# Patient Record
Sex: FEMALE | Race: WHITE | Employment: UNEMPLOYED | URBAN - METROPOLITAN AREA
[De-identification: names, ages, dates, MRNs, and addresses within clinical notes are randomized per-mention and may not be internally consistent; named-entity substitution may affect disease eponyms.]

---

## 2018-07-26 ENCOUNTER — HOSPITAL ENCOUNTER (OUTPATIENT)
Dept: OTHER | Age: 18
Discharge: HOME | End: 2018-07-31

## 2018-07-26 ENCOUNTER — INITIAL PRENATAL (OUTPATIENT)
Dept: OBGYN CLINIC | Age: 18
End: 2018-07-26

## 2018-07-26 VITALS
BODY MASS INDEX: 28 KG/M2 | HEIGHT: 63 IN | SYSTOLIC BLOOD PRESSURE: 116 MMHG | DIASTOLIC BLOOD PRESSURE: 70 MMHG | HEART RATE: 80 BPM | WEIGHT: 158 LBS

## 2018-07-26 DIAGNOSIS — Z34.81 PRENATAL CARE, SUBSEQUENT PREGNANCY, FIRST TRIMESTER: Primary | ICD-10-CM

## 2018-07-26 DIAGNOSIS — Z78.9 LANGUAGE BARRIER: ICD-10-CM

## 2018-07-26 LAB
ABO/RH: NORMAL
ANTIBODY SCREEN: NORMAL
BASOPHILS ABSOLUTE: 0 K/UL (ref 0–0.2)
BASOPHILS RELATIVE PERCENT: 0.3 %
EOSINOPHILS ABSOLUTE: 0.2 K/UL (ref 0–0.6)
EOSINOPHILS RELATIVE PERCENT: 2 %
HCT VFR BLD CALC: 39.9 % (ref 36–48)
HEMOGLOBIN: 13.7 G/DL (ref 12–16)
HEPATITIS B SURFACE ANTIGEN INTERPRETATION: NORMAL
HEPATITIS C ANTIBODY INTERPRETATION: NORMAL
LYMPHOCYTES ABSOLUTE: 2.2 K/UL (ref 1–5.1)
LYMPHOCYTES RELATIVE PERCENT: 27.6 %
MCH RBC QN AUTO: 28.3 PG (ref 26–34)
MCHC RBC AUTO-ENTMCNC: 34.2 G/DL (ref 31–36)
MCV RBC AUTO: 82.9 FL (ref 80–100)
MONOCYTES ABSOLUTE: 0.4 K/UL (ref 0–1.3)
MONOCYTES RELATIVE PERCENT: 5.4 %
NEUTROPHILS ABSOLUTE: 5.2 K/UL (ref 1.7–7.7)
NEUTROPHILS RELATIVE PERCENT: 64.7 %
PDW BLD-RTO: 14.4 % (ref 12.4–15.4)
PLATELET # BLD: 271 K/UL (ref 135–450)
PMV BLD AUTO: 9.7 FL (ref 5–10.5)
RBC # BLD: 4.82 M/UL (ref 4–5.2)
RPR: NORMAL
RUBELLA ANTIBODY IGG: <0.2 IU/ML
WBC # BLD: 8 K/UL (ref 4–11)

## 2018-07-26 PROCEDURE — 99204 OFFICE O/P NEW MOD 45 MIN: CPT | Performed by: OBSTETRICS & GYNECOLOGY

## 2018-07-26 RX ORDER — VITAMIN A, VITAMIN C, VITAMIN D-3, VITAMIN E, VITAMIN B-1, VITAMIN B-2, NIACIN, VITAMIN B-6, CALCIUM, IRON, ZINC, COPPER 4000; 120; 400; 22; 1.84; 3; 20; 10; 1; 12; 200; 27; 25; 2 [IU]/1; MG/1; [IU]/1; MG/1; MG/1; MG/1; MG/1; MG/1; MG/1; UG/1; MG/1; MG/1; MG/1; MG/1
1 TABLET ORAL DAILY
Qty: 30 TABLET | Refills: 11 | Status: ON HOLD | OUTPATIENT
Start: 2018-07-26 | End: 2019-02-21 | Stop reason: HOSPADM

## 2018-07-27 LAB
C TRACH DNA GENITAL QL NAA+PROBE: NEGATIVE
HIV AG/AB: NORMAL
HIV ANTIGEN: NORMAL
HIV-1 ANTIBODY: NORMAL
HIV-2 AB: NORMAL
N. GONORRHOEAE DNA: NEGATIVE
URINE CULTURE, ROUTINE: NORMAL

## 2018-08-01 ENCOUNTER — HOSPITAL ENCOUNTER (OUTPATIENT)
Dept: OTHER | Age: 18
Discharge: HOME | End: 2018-08-31

## 2018-08-23 ENCOUNTER — ROUTINE PRENATAL (OUTPATIENT)
Dept: OBGYN CLINIC | Age: 18
End: 2018-08-23

## 2018-08-23 VITALS
BODY MASS INDEX: 28 KG/M2 | HEART RATE: 74 BPM | WEIGHT: 158 LBS | DIASTOLIC BLOOD PRESSURE: 60 MMHG | SYSTOLIC BLOOD PRESSURE: 123 MMHG

## 2018-08-23 DIAGNOSIS — Z34.81 PRENATAL CARE, SUBSEQUENT PREGNANCY, FIRST TRIMESTER: Primary | ICD-10-CM

## 2018-08-23 DIAGNOSIS — Z78.9 LANGUAGE BARRIER: ICD-10-CM

## 2018-08-23 PROCEDURE — 99212 OFFICE O/P EST SF 10 MIN: CPT | Performed by: OBSTETRICS & GYNECOLOGY

## 2018-08-23 RX ORDER — ONDANSETRON 4 MG/1
4 TABLET, FILM COATED ORAL EVERY 8 HOURS PRN
Qty: 20 TABLET | Refills: 1 | Status: ON HOLD | OUTPATIENT
Start: 2018-08-23 | End: 2019-02-21 | Stop reason: HOSPADM

## 2018-09-01 ENCOUNTER — HOSPITAL ENCOUNTER (OUTPATIENT)
Dept: OTHER | Age: 18
Discharge: HOME | End: 2018-09-01

## 2018-09-20 ENCOUNTER — ROUTINE PRENATAL (OUTPATIENT)
Dept: OBGYN CLINIC | Age: 18
End: 2018-09-20

## 2018-09-20 VITALS
WEIGHT: 163 LBS | HEART RATE: 83 BPM | DIASTOLIC BLOOD PRESSURE: 53 MMHG | SYSTOLIC BLOOD PRESSURE: 110 MMHG | BODY MASS INDEX: 28.88 KG/M2

## 2018-09-20 DIAGNOSIS — Z34.82 PRENATAL CARE, SUBSEQUENT PREGNANCY, SECOND TRIMESTER: Primary | ICD-10-CM

## 2018-09-20 PROCEDURE — 99212 OFFICE O/P EST SF 10 MIN: CPT | Performed by: OBSTETRICS & GYNECOLOGY

## 2018-09-20 NOTE — PROGRESS NOTES
Discussed AFP,CF screening; Pt Declined testing and signed consents. Denies bleeding, leaking of fluid or pain.

## 2018-10-16 ENCOUNTER — ROUTINE PRENATAL (OUTPATIENT)
Dept: OBGYN | Age: 18
End: 2018-10-16

## 2018-10-16 ENCOUNTER — HOSPITAL ENCOUNTER (OUTPATIENT)
Dept: ULTRASOUND IMAGING | Age: 18
Discharge: HOME | End: 2018-10-16

## 2018-10-16 VITALS
BODY MASS INDEX: 29.59 KG/M2 | HEART RATE: 85 BPM | DIASTOLIC BLOOD PRESSURE: 57 MMHG | SYSTOLIC BLOOD PRESSURE: 120 MMHG | WEIGHT: 167 LBS

## 2018-10-16 DIAGNOSIS — Z34.82 PRENATAL CARE, SUBSEQUENT PREGNANCY, SECOND TRIMESTER: ICD-10-CM

## 2018-10-16 DIAGNOSIS — Z34.82 PRENATAL CARE, SUBSEQUENT PREGNANCY, SECOND TRIMESTER: Primary | ICD-10-CM

## 2018-10-16 PROCEDURE — 99212 OFFICE O/P EST SF 10 MIN: CPT | Performed by: OBSTETRICS & GYNECOLOGY

## 2018-11-13 ENCOUNTER — HOSPITAL ENCOUNTER (OUTPATIENT)
Age: 18
Discharge: HOME | End: 2018-11-13

## 2018-11-13 ENCOUNTER — ROUTINE PRENATAL (OUTPATIENT)
Dept: OBGYN | Age: 18
End: 2018-11-13

## 2018-11-13 VITALS
DIASTOLIC BLOOD PRESSURE: 46 MMHG | WEIGHT: 180 LBS | HEART RATE: 97 BPM | BODY MASS INDEX: 31.9 KG/M2 | SYSTOLIC BLOOD PRESSURE: 103 MMHG

## 2018-11-13 DIAGNOSIS — Z34.82 PRENATAL CARE, SUBSEQUENT PREGNANCY, SECOND TRIMESTER: Primary | ICD-10-CM

## 2018-11-13 LAB
GLUCOSE CHALLENGE: 89 MG/DL
HCT VFR BLD CALC: 34.2 % (ref 36–48)
HEMOGLOBIN: 11.4 G/DL (ref 12–16)
MCH RBC QN AUTO: 28.5 PG (ref 26–34)
MCHC RBC AUTO-ENTMCNC: 33.2 G/DL (ref 31–36)
MCV RBC AUTO: 85.6 FL (ref 80–100)
PDW BLD-RTO: 13.7 % (ref 12.4–15.4)
PLATELET # BLD: 306 K/UL (ref 135–450)
PMV BLD AUTO: 9.1 FL (ref 5–10.5)
RBC # BLD: 4 M/UL (ref 4–5.2)
WBC # BLD: 11.6 K/UL (ref 4–11)

## 2018-11-13 PROCEDURE — 36415 COLL VENOUS BLD VENIPUNCTURE: CPT

## 2018-11-13 PROCEDURE — 99212 OFFICE O/P EST SF 10 MIN: CPT | Performed by: OBSTETRICS & GYNECOLOGY

## 2018-11-13 PROCEDURE — G0008 ADMIN INFLUENZA VIRUS VAC: HCPCS | Performed by: OBSTETRICS & GYNECOLOGY

## 2018-11-13 PROCEDURE — 90686 IIV4 VACC NO PRSV 0.5 ML IM: CPT | Performed by: OBSTETRICS & GYNECOLOGY

## 2018-11-13 PROCEDURE — 85027 COMPLETE CBC AUTOMATED: CPT

## 2018-11-13 PROCEDURE — 82950 GLUCOSE TEST: CPT

## 2018-11-13 PROCEDURE — 6360000002 HC RX W HCPCS: Performed by: OBSTETRICS & GYNECOLOGY

## 2018-11-13 RX ADMIN — INFLUENZA A VIRUS A/CALIFORNIA/7/2009 X-179A (H1N1) ANTIGEN (FORMALDEHYDE INACTIVATED), INFLUENZA A VIRUS A/SWITZERLAND/9715293/2013 NIB-88 (H3N2) ANTIGEN (FORMALDEHYDE INACTIVATED), INFLUENZA B VIRUS B/PHUKET/3073/2013 ANTIGEN (FORMALDEHYDE INACTIVATED), AND INFLUENZA B VIRUS B/BRISBANE/60/2008 ANTIGEN (FORMALDEHYDE INACTIVATED) 0.5 ML: 15; 15; 15; 15 INJECTION, SUSPENSION INTRAMUSCULAR at 15:59

## 2018-11-13 NOTE — PROGRESS NOTES
Reports + fetal movement, denies bleeding, leaking of fluid or ctx. Flu VIS given and explained, consent signed, vaccine given. 13lb wt gain since last visit.

## 2018-12-11 ENCOUNTER — ROUTINE PRENATAL (OUTPATIENT)
Dept: OBGYN | Age: 18
End: 2018-12-11

## 2018-12-11 VITALS — SYSTOLIC BLOOD PRESSURE: 111 MMHG | WEIGHT: 182 LBS | BODY MASS INDEX: 32.25 KG/M2 | DIASTOLIC BLOOD PRESSURE: 57 MMHG

## 2018-12-11 DIAGNOSIS — Z34.92 PRENATAL CARE IN SECOND TRIMESTER: Primary | ICD-10-CM

## 2018-12-11 PROCEDURE — 99214 OFFICE O/P EST MOD 30 MIN: CPT | Performed by: OBSTETRICS & GYNECOLOGY

## 2019-01-10 ENCOUNTER — ROUTINE PRENATAL (OUTPATIENT)
Dept: OBGYN | Age: 19
End: 2019-01-10

## 2019-01-10 VITALS
WEIGHT: 187 LBS | HEART RATE: 97 BPM | DIASTOLIC BLOOD PRESSURE: 55 MMHG | BODY MASS INDEX: 33.14 KG/M2 | SYSTOLIC BLOOD PRESSURE: 115 MMHG

## 2019-01-10 DIAGNOSIS — Z34.93 PRENATAL CARE IN THIRD TRIMESTER: Primary | ICD-10-CM

## 2019-01-10 PROCEDURE — 99212 OFFICE O/P EST SF 10 MIN: CPT | Performed by: OBSTETRICS & GYNECOLOGY

## 2019-01-24 ENCOUNTER — ROUTINE PRENATAL (OUTPATIENT)
Dept: OBGYN | Age: 19
End: 2019-01-24

## 2019-01-24 VITALS
HEART RATE: 93 BPM | WEIGHT: 188 LBS | SYSTOLIC BLOOD PRESSURE: 113 MMHG | DIASTOLIC BLOOD PRESSURE: 68 MMHG | BODY MASS INDEX: 33.31 KG/M2

## 2019-01-24 DIAGNOSIS — Z34.93 PRENATAL CARE IN THIRD TRIMESTER: Primary | ICD-10-CM

## 2019-01-24 LAB
BILIRUBIN URINE: NEGATIVE MG/DL
BLOOD, URINE: NEGATIVE
CLARITY: CLEAR
COLOR: YELLOW
GLUCOSE URINE: NEGATIVE MG/DL
KETONES, URINE: NEGATIVE MG/DL
LEUKOCYTE ESTERASE, URINE: ABNORMAL
NITRITE, URINE: NEGATIVE
PH UA: 7
PROTEIN UA: NEGATIVE MG/DL
SPECIFIC GRAVITY UA: 1.02
UROBILINOGEN, URINE: 0.2 E.U./DL

## 2019-01-24 PROCEDURE — 90471 IMMUNIZATION ADMIN: CPT | Performed by: OBSTETRICS & GYNECOLOGY

## 2019-01-24 PROCEDURE — 90715 TDAP VACCINE 7 YRS/> IM: CPT | Performed by: OBSTETRICS & GYNECOLOGY

## 2019-01-24 PROCEDURE — 81003 URINALYSIS AUTO W/O SCOPE: CPT

## 2019-01-24 PROCEDURE — 6360000002 HC RX W HCPCS: Performed by: OBSTETRICS & GYNECOLOGY

## 2019-01-24 PROCEDURE — 99212 OFFICE O/P EST SF 10 MIN: CPT | Performed by: OBSTETRICS & GYNECOLOGY

## 2019-01-24 RX ADMIN — TETANUS TOXOID, REDUCED DIPHTHERIA TOXOID AND ACELLULAR PERTUSSIS VACCINE, ADSORBED 0.5 ML: 5; 2.5; 8; 8; 2.5 SUSPENSION INTRAMUSCULAR at 10:20

## 2019-02-07 ENCOUNTER — ROUTINE PRENATAL (OUTPATIENT)
Dept: OBGYN | Age: 19
End: 2019-02-07

## 2019-02-07 VITALS
WEIGHT: 192.4 LBS | HEART RATE: 89 BPM | DIASTOLIC BLOOD PRESSURE: 65 MMHG | BODY MASS INDEX: 34.09 KG/M2 | SYSTOLIC BLOOD PRESSURE: 115 MMHG

## 2019-02-07 DIAGNOSIS — Z34.93 PRENATAL CARE IN THIRD TRIMESTER: ICD-10-CM

## 2019-02-07 DIAGNOSIS — Z34.83 PRENATAL CARE, SUBSEQUENT PREGNANCY, THIRD TRIMESTER: Primary | ICD-10-CM

## 2019-02-07 PROCEDURE — 87081 CULTURE SCREEN ONLY: CPT

## 2019-02-07 PROCEDURE — 81003 URINALYSIS AUTO W/O SCOPE: CPT

## 2019-02-07 PROCEDURE — 99212 OFFICE O/P EST SF 10 MIN: CPT | Performed by: OBSTETRICS & GYNECOLOGY

## 2019-02-11 LAB — GROUP B STREP CULTURE: NORMAL

## 2019-02-14 ENCOUNTER — ROUTINE PRENATAL (OUTPATIENT)
Dept: OBGYN | Age: 19
End: 2019-02-14

## 2019-02-14 VITALS
HEART RATE: 83 BPM | WEIGHT: 191 LBS | BODY MASS INDEX: 33.84 KG/M2 | SYSTOLIC BLOOD PRESSURE: 116 MMHG | DIASTOLIC BLOOD PRESSURE: 61 MMHG

## 2019-02-14 DIAGNOSIS — Z34.93 PRENATAL CARE IN THIRD TRIMESTER: Primary | ICD-10-CM

## 2019-02-14 PROCEDURE — 99213 OFFICE O/P EST LOW 20 MIN: CPT | Performed by: OBSTETRICS & GYNECOLOGY

## 2019-02-14 PROCEDURE — 81003 URINALYSIS AUTO W/O SCOPE: CPT

## 2019-02-20 ENCOUNTER — ANESTHESIA EVENT (OUTPATIENT)
Dept: LABOR AND DELIVERY | Age: 19
DRG: 560 | End: 2019-02-20
Payer: MEDICAID

## 2019-02-20 ENCOUNTER — ANESTHESIA (OUTPATIENT)
Dept: LABOR AND DELIVERY | Age: 19
DRG: 560 | End: 2019-02-20
Payer: MEDICAID

## 2019-02-20 ENCOUNTER — HOSPITAL ENCOUNTER (INPATIENT)
Age: 19
LOS: 2 days | Discharge: HOME | DRG: 560 | End: 2019-02-22
Attending: OBSTETRICS & GYNECOLOGY | Admitting: OBSTETRICS & GYNECOLOGY
Payer: MEDICAID

## 2019-02-20 LAB
AMPHETAMINE SCREEN, URINE: NORMAL
BARBITURATE SCREEN URINE: NORMAL
BENZODIAZEPINE SCREEN, URINE: NORMAL
BUPRENORPHINE URINE: NORMAL
CANNABINOID SCREEN URINE: NORMAL
COCAINE METABOLITE SCREEN URINE: NORMAL
Lab: NORMAL
METHADONE SCREEN, URINE: NORMAL
OPIATE SCREEN URINE: NORMAL
OXYCODONE URINE: NORMAL
PH UA: 6
PHENCYCLIDINE SCREEN URINE: NORMAL
PROPOXYPHENE SCREEN: NORMAL

## 2019-02-20 PROCEDURE — 1220000000 HC SEMI PRIVATE OB R&B

## 2019-02-20 PROCEDURE — 80307 DRUG TEST PRSMV CHEM ANLYZR: CPT

## 2019-02-20 PROCEDURE — 2580000003 HC RX 258: Performed by: OBSTETRICS & GYNECOLOGY

## 2019-02-20 PROCEDURE — 85027 COMPLETE CBC AUTOMATED: CPT

## 2019-02-20 PROCEDURE — 86780 TREPONEMA PALLIDUM: CPT

## 2019-02-20 RX ORDER — SODIUM CHLORIDE, SODIUM LACTATE, POTASSIUM CHLORIDE, CALCIUM CHLORIDE 600; 310; 30; 20 MG/100ML; MG/100ML; MG/100ML; MG/100ML
INJECTION, SOLUTION INTRAVENOUS CONTINUOUS
Status: DISCONTINUED | OUTPATIENT
Start: 2019-02-20 | End: 2019-02-21

## 2019-02-20 RX ORDER — TERBUTALINE SULFATE 1 MG/ML
0.25 INJECTION, SOLUTION SUBCUTANEOUS
Status: ACTIVE | OUTPATIENT
Start: 2019-02-20 | End: 2019-02-20

## 2019-02-20 RX ORDER — DOCUSATE SODIUM 100 MG/1
100 CAPSULE, LIQUID FILLED ORAL 2 TIMES DAILY
Status: DISCONTINUED | OUTPATIENT
Start: 2019-02-20 | End: 2019-02-21

## 2019-02-20 RX ORDER — SODIUM CHLORIDE 0.9 % (FLUSH) 0.9 %
10 SYRINGE (ML) INJECTION EVERY 12 HOURS SCHEDULED
Status: DISCONTINUED | OUTPATIENT
Start: 2019-02-20 | End: 2019-02-21

## 2019-02-20 RX ORDER — SODIUM CHLORIDE 0.9 % (FLUSH) 0.9 %
10 SYRINGE (ML) INJECTION PRN
Status: DISCONTINUED | OUTPATIENT
Start: 2019-02-20 | End: 2019-02-21

## 2019-02-20 RX ORDER — ONDANSETRON 2 MG/ML
4 INJECTION INTRAMUSCULAR; INTRAVENOUS EVERY 6 HOURS PRN
Status: DISCONTINUED | OUTPATIENT
Start: 2019-02-20 | End: 2019-02-21

## 2019-02-20 RX ADMIN — SODIUM CHLORIDE, POTASSIUM CHLORIDE, SODIUM LACTATE AND CALCIUM CHLORIDE: 600; 310; 30; 20 INJECTION, SOLUTION INTRAVENOUS at 23:26

## 2019-02-20 RX ADMIN — SODIUM CHLORIDE, POTASSIUM CHLORIDE, SODIUM LACTATE AND CALCIUM CHLORIDE: 600; 310; 30; 20 INJECTION, SOLUTION INTRAVENOUS at 22:57

## 2019-02-21 LAB
HCT VFR BLD CALC: 34.3 % (ref 36–48)
HEMATOLOGY PATH CONSULT: NORMAL
HEMATOLOGY PATH CONSULT: YES
HEMOGLOBIN: 11 G/DL (ref 12–16)
MCH RBC QN AUTO: 22 PG (ref 26–34)
MCHC RBC AUTO-ENTMCNC: 31.9 G/DL (ref 31–36)
MCV RBC AUTO: 68.9 FL (ref 80–100)
PDW BLD-RTO: 19.5 % (ref 12.4–15.4)
PLATELET # BLD: 294 K/UL (ref 135–450)
PMV BLD AUTO: 9.5 FL (ref 5–10.5)
RBC # BLD: 4.99 M/UL (ref 4–5.2)
SLIDE REVIEW: ABNORMAL
SPECIMEN STATUS: NORMAL
TOTAL SYPHILLIS IGG/IGM: NORMAL
WBC # BLD: 13.8 K/UL (ref 4–11)

## 2019-02-21 PROCEDURE — 1200000000 HC SEMI PRIVATE

## 2019-02-21 PROCEDURE — 2580000003 HC RX 258: Performed by: OBSTETRICS & GYNECOLOGY

## 2019-02-21 PROCEDURE — 7200000001 HC VAGINAL DELIVERY

## 2019-02-21 PROCEDURE — 0UQGXZZ REPAIR VAGINA, EXTERNAL APPROACH: ICD-10-PCS | Performed by: OBSTETRICS & GYNECOLOGY

## 2019-02-21 PROCEDURE — 6370000000 HC RX 637 (ALT 250 FOR IP): Performed by: OBSTETRICS & GYNECOLOGY

## 2019-02-21 PROCEDURE — 6360000002 HC RX W HCPCS: Performed by: OBSTETRICS & GYNECOLOGY

## 2019-02-21 RX ORDER — ACETAMINOPHEN 500 MG
1000 TABLET ORAL EVERY 6 HOURS PRN
Status: DISCONTINUED | OUTPATIENT
Start: 2019-02-21 | End: 2019-02-22 | Stop reason: HOSPADM

## 2019-02-21 RX ORDER — ONDANSETRON 4 MG/1
4 TABLET, ORALLY DISINTEGRATING ORAL EVERY 6 HOURS PRN
Status: DISCONTINUED | OUTPATIENT
Start: 2019-02-21 | End: 2019-02-22 | Stop reason: HOSPADM

## 2019-02-21 RX ORDER — IBUPROFEN 800 MG/1
800 TABLET ORAL EVERY 6 HOURS PRN
Status: DISCONTINUED | OUTPATIENT
Start: 2019-02-21 | End: 2019-02-22 | Stop reason: HOSPADM

## 2019-02-21 RX ORDER — SIMETHICONE 80 MG
80 TABLET,CHEWABLE ORAL EVERY 6 HOURS PRN
Status: DISCONTINUED | OUTPATIENT
Start: 2019-02-21 | End: 2019-02-22 | Stop reason: HOSPADM

## 2019-02-21 RX ORDER — IBUPROFEN 800 MG/1
800 TABLET ORAL EVERY 8 HOURS PRN
Qty: 30 TABLET | Refills: 0 | Status: SHIPPED | OUTPATIENT
Start: 2019-02-21 | End: 2019-10-15 | Stop reason: SDUPTHER

## 2019-02-21 RX ORDER — SODIUM CHLORIDE 0.9 % (FLUSH) 0.9 %
10 SYRINGE (ML) INJECTION PRN
Status: DISCONTINUED | OUTPATIENT
Start: 2019-02-21 | End: 2019-02-22 | Stop reason: HOSPADM

## 2019-02-21 RX ORDER — FERROUS SULFATE 325(65) MG
325 TABLET ORAL DAILY
Status: DISCONTINUED | OUTPATIENT
Start: 2019-02-21 | End: 2019-02-22 | Stop reason: HOSPADM

## 2019-02-21 RX ORDER — METHYLERGONOVINE MALEATE 0.2 MG/ML
200 INJECTION INTRAVENOUS
Status: ACTIVE | OUTPATIENT
Start: 2019-02-21 | End: 2019-02-21

## 2019-02-21 RX ORDER — FAMOTIDINE 20 MG/1
20 TABLET, FILM COATED ORAL 2 TIMES DAILY PRN
Status: DISCONTINUED | OUTPATIENT
Start: 2019-02-21 | End: 2019-02-22 | Stop reason: HOSPADM

## 2019-02-21 RX ORDER — ACETAMINOPHEN 500 MG
1000 TABLET ORAL EVERY 6 HOURS PRN
Qty: 30 TABLET | Refills: 0 | Status: SHIPPED | OUTPATIENT
Start: 2019-02-21

## 2019-02-21 RX ORDER — LANOLIN 100 %
OINTMENT (GRAM) TOPICAL PRN
Status: DISCONTINUED | OUTPATIENT
Start: 2019-02-21 | End: 2019-02-22 | Stop reason: HOSPADM

## 2019-02-21 RX ORDER — ONDANSETRON 2 MG/ML
4 INJECTION INTRAMUSCULAR; INTRAVENOUS EVERY 6 HOURS PRN
Status: DISCONTINUED | OUTPATIENT
Start: 2019-02-21 | End: 2019-02-22 | Stop reason: HOSPADM

## 2019-02-21 RX ORDER — SENNA AND DOCUSATE SODIUM 50; 8.6 MG/1; MG/1
1 TABLET, FILM COATED ORAL DAILY PRN
Status: DISCONTINUED | OUTPATIENT
Start: 2019-02-21 | End: 2019-02-22 | Stop reason: HOSPADM

## 2019-02-21 RX ORDER — SODIUM CHLORIDE 0.9 % (FLUSH) 0.9 %
10 SYRINGE (ML) INJECTION EVERY 12 HOURS SCHEDULED
Status: DISCONTINUED | OUTPATIENT
Start: 2019-02-21 | End: 2019-02-22 | Stop reason: HOSPADM

## 2019-02-21 RX ORDER — MISOPROSTOL 100 UG/1
800 TABLET ORAL PRN
Status: DISCONTINUED | OUTPATIENT
Start: 2019-02-21 | End: 2019-02-22 | Stop reason: HOSPADM

## 2019-02-21 RX ORDER — DOCUSATE SODIUM 100 MG/1
100 CAPSULE, LIQUID FILLED ORAL 2 TIMES DAILY
Status: DISCONTINUED | OUTPATIENT
Start: 2019-02-21 | End: 2019-02-22 | Stop reason: HOSPADM

## 2019-02-21 RX ORDER — OXYCODONE HYDROCHLORIDE 5 MG/1
5 TABLET ORAL EVERY 4 HOURS PRN
Status: DISCONTINUED | OUTPATIENT
Start: 2019-02-21 | End: 2019-02-22 | Stop reason: HOSPADM

## 2019-02-21 RX ADMIN — DOCUSATE SODIUM 100 MG: 100 CAPSULE, LIQUID FILLED ORAL at 09:08

## 2019-02-21 RX ADMIN — Medication 10 ML: at 09:08

## 2019-02-21 RX ADMIN — Medication 1 MILLI-UNITS/MIN: at 02:00

## 2019-02-21 RX ADMIN — DOCUSATE SODIUM 100 MG: 100 CAPSULE, LIQUID FILLED ORAL at 20:35

## 2019-02-21 RX ADMIN — IBUPROFEN 800 MG: 800 TABLET, FILM COATED ORAL at 09:07

## 2019-02-21 ASSESSMENT — PAIN SCALES - GENERAL: PAINLEVEL_OUTOF10: 3

## 2019-02-22 VITALS
TEMPERATURE: 98.1 F | SYSTOLIC BLOOD PRESSURE: 112 MMHG | DIASTOLIC BLOOD PRESSURE: 63 MMHG | WEIGHT: 191 LBS | HEART RATE: 85 BPM | RESPIRATION RATE: 18 BRPM | BODY MASS INDEX: 33.84 KG/M2

## 2019-02-22 PROCEDURE — 6360000002 HC RX W HCPCS: Performed by: OBSTETRICS & GYNECOLOGY

## 2019-02-22 PROCEDURE — 90471 IMMUNIZATION ADMIN: CPT | Performed by: OBSTETRICS & GYNECOLOGY

## 2019-02-22 PROCEDURE — 90707 MMR VACCINE SC: CPT | Performed by: OBSTETRICS & GYNECOLOGY

## 2019-02-22 PROCEDURE — 6370000000 HC RX 637 (ALT 250 FOR IP): Performed by: OBSTETRICS & GYNECOLOGY

## 2019-02-22 RX ADMIN — MEASLES, MUMPS, AND RUBELLA VIRUS VACCINE LIVE 0.5 ML: 1000; 12500; 1000 INJECTION, POWDER, LYOPHILIZED, FOR SUSPENSION SUBCUTANEOUS at 11:46

## 2019-02-22 RX ADMIN — DOCUSATE SODIUM 100 MG: 100 CAPSULE, LIQUID FILLED ORAL at 07:48

## 2019-04-04 ENCOUNTER — POSTPARTUM VISIT (OUTPATIENT)
Dept: OBGYN | Age: 19
End: 2019-04-04

## 2019-04-04 VITALS
SYSTOLIC BLOOD PRESSURE: 111 MMHG | HEART RATE: 81 BPM | DIASTOLIC BLOOD PRESSURE: 61 MMHG | BODY MASS INDEX: 30.87 KG/M2 | WEIGHT: 174.2 LBS

## 2019-04-04 PROCEDURE — 99215 OFFICE O/P EST HI 40 MIN: CPT | Performed by: OBSTETRICS & GYNECOLOGY

## 2019-04-04 RX ORDER — FLUOXETINE HYDROCHLORIDE 20 MG/1
20 CAPSULE ORAL DAILY
Qty: 30 CAPSULE | Refills: 3 | Status: SHIPPED | OUTPATIENT
Start: 2019-04-04 | End: 2022-04-18

## 2019-04-04 RX ORDER — ACETAMINOPHEN AND CODEINE PHOSPHATE 120; 12 MG/5ML; MG/5ML
1 SOLUTION ORAL DAILY
Qty: 28 TABLET | Refills: 3 | Status: SHIPPED | OUTPATIENT
Start: 2019-04-04 | End: 2022-04-18

## 2019-04-04 NOTE — PROGRESS NOTES
Subjective:      Familia Skelton is a 19 y.o. female who presents 6 weeks post partum following a spontaneous vaginal delivery. I have fully reviewed the prenatal and intrapartum course. The delivery was at 44 gestational weeks. Outcome: . Anesthesia: None. Postpartum course has been uncomplicated}. Baby's course has been doing well without problems. Baby is feeding both breast and bottle - [place]. Bleeding thin lochia. Bowel function is normal. Bladder function is normal. Patient is not sexually active. Contraception method is none. Patient would like to discuss contraception with the doctor. Patient is having irritation in eyes. Postpartum depression screening: positive but no SI/HI. Patient's medications, allergies, past medical, surgical, social and family histories were reviewed and updated as appropriate. Review of Systems  Pertinent items are noted in HPI. Objective:     /61   Pulse 81   Wt 174 lb 3.2 oz (79 kg)   LMP 2019   Breastfeeding? Yes Comment: breast and bottle  BMI 30.87 kg/m²   General:  alert, appears stated age and cooperative    Breasts: Inspection negative. No nipple discharge or bleeding. No masses or nodularity palpable   Lungs: clear to auscultation bilaterally   Heart:  regular rate and rhythm, S1, S2 normal, no murmur, click, rub or gallop   Abdomen: soft, non-tender; bowel sounds normal; no masses,  no organomegaly    Vulva:  normal   Vagina: normal vagina   Cervix:  no lesions   Corpus: normal   Adnexa:  Normal adnexa   Rectal Exam: Not performed. Assessment:     Normal postpartum exam. Pap smear not done at today's visit. Plan:     1. Contraception: oral progesterone-only contraceptive  2. Postpartum depression: will start on Prozac and follow up in 3 weeks  3.  Follow up: 3 weeks

## 2019-04-04 NOTE — PROGRESS NOTES
Social Service Note:   Patient completed depression scale. Patient scored a 10  and is showing signs of depression but denies suicidal or homicidal thoughts. Patient states she has been feeling sad and not wanting to care for child. Patient mother has been helping patient complete care. Patient referred to [address] [place] [address] for mental health services and formerly [facility] to speak with a .      Meghann Gómez BSW, [place]

## 2019-10-15 ENCOUNTER — APPOINTMENT (OUTPATIENT)
Dept: GENERAL RADIOLOGY | Age: 19
End: 2019-10-15

## 2019-10-15 ENCOUNTER — HOSPITAL ENCOUNTER (EMERGENCY)
Age: 19
Discharge: HOME | End: 2019-10-15
Attending: EMERGENCY MEDICINE

## 2019-10-15 VITALS
DIASTOLIC BLOOD PRESSURE: 61 MMHG | HEIGHT: 62 IN | SYSTOLIC BLOOD PRESSURE: 94 MMHG | OXYGEN SATURATION: 97 % | RESPIRATION RATE: 18 BRPM | TEMPERATURE: 98.9 F | HEART RATE: 69 BPM | WEIGHT: 174 LBS | BODY MASS INDEX: 32.02 KG/M2

## 2019-10-15 DIAGNOSIS — S60.211A TRAUMATIC HEMATOMA OF RIGHT WRIST, INITIAL ENCOUNTER: Primary | ICD-10-CM

## 2019-10-15 DIAGNOSIS — M24.541 CONTRACTURE OF JOINT OF FINGER OF RIGHT HAND: ICD-10-CM

## 2019-10-15 PROCEDURE — 6370000000 HC RX 637 (ALT 250 FOR IP): Performed by: PHYSICIAN ASSISTANT

## 2019-10-15 PROCEDURE — 73090 X-RAY EXAM OF FOREARM: CPT

## 2019-10-15 PROCEDURE — 73110 X-RAY EXAM OF WRIST: CPT

## 2019-10-15 PROCEDURE — 99283 EMERGENCY DEPT VISIT LOW MDM: CPT

## 2019-10-15 PROCEDURE — 4500000023 HC ED LEVEL 3 PROCEDURE

## 2019-10-15 RX ORDER — IBUPROFEN 800 MG/1
800 TABLET ORAL EVERY 8 HOURS PRN
Qty: 30 TABLET | Refills: 0 | Status: SHIPPED | OUTPATIENT
Start: 2019-10-15 | End: 2022-04-18

## 2019-10-15 RX ORDER — IBUPROFEN 600 MG/1
600 TABLET ORAL ONCE
Status: COMPLETED | OUTPATIENT
Start: 2019-10-15 | End: 2019-10-15

## 2019-10-15 RX ADMIN — IBUPROFEN 600 MG: 600 TABLET, FILM COATED ORAL at 11:17

## 2019-10-15 ASSESSMENT — PAIN SCALES - GENERAL: PAINLEVEL_OUTOF10: 7

## 2019-10-15 ASSESSMENT — PAIN DESCRIPTION - LOCATION: LOCATION: HAND;WRIST

## 2019-10-15 ASSESSMENT — PAIN DESCRIPTION - PAIN TYPE: TYPE: ACUTE PAIN

## 2019-10-15 ASSESSMENT — PAIN DESCRIPTION - ORIENTATION: ORIENTATION: RIGHT

## 2019-10-21 ENCOUNTER — OFFICE VISIT (OUTPATIENT)
Dept: ORTHOPEDIC SURGERY | Age: 19
End: 2019-10-21

## 2019-10-21 VITALS
SYSTOLIC BLOOD PRESSURE: 102 MMHG | HEIGHT: 62 IN | DIASTOLIC BLOOD PRESSURE: 65 MMHG | BODY MASS INDEX: 32.02 KG/M2 | WEIGHT: 174 LBS

## 2019-10-21 DIAGNOSIS — M25.531 RIGHT WRIST PAIN: ICD-10-CM

## 2019-10-21 PROCEDURE — 99203 OFFICE O/P NEW LOW 30 MIN: CPT | Performed by: PHYSICIAN ASSISTANT

## 2020-08-04 ENCOUNTER — HOSPITAL ENCOUNTER (EMERGENCY)
Age: 20
Discharge: HOME | End: 2020-08-04
Attending: EMERGENCY MEDICINE

## 2020-08-04 VITALS
RESPIRATION RATE: 16 BRPM | SYSTOLIC BLOOD PRESSURE: 117 MMHG | TEMPERATURE: 97.6 F | BODY MASS INDEX: 26.68 KG/M2 | HEART RATE: 78 BPM | DIASTOLIC BLOOD PRESSURE: 69 MMHG | WEIGHT: 166 LBS | HEIGHT: 66 IN | OXYGEN SATURATION: 99 %

## 2020-08-04 PROCEDURE — 6370000000 HC RX 637 (ALT 250 FOR IP)

## 2020-08-04 PROCEDURE — 99282 EMERGENCY DEPT VISIT SF MDM: CPT

## 2020-08-04 RX ORDER — VALACYCLOVIR HYDROCHLORIDE 1 G/1
1000 TABLET, FILM COATED ORAL 3 TIMES DAILY
Qty: 21 TABLET | Refills: 0 | Status: SHIPPED | OUTPATIENT
Start: 2020-08-04 | End: 2020-08-11

## 2020-08-04 RX ORDER — VALACYCLOVIR HYDROCHLORIDE 500 MG/1
1000 TABLET, FILM COATED ORAL ONCE
Status: DISCONTINUED | OUTPATIENT
Start: 2020-08-04 | End: 2020-08-04

## 2020-08-04 RX ORDER — PREDNISONE 20 MG/1
60 TABLET ORAL ONCE
Status: COMPLETED | OUTPATIENT
Start: 2020-08-04 | End: 2020-08-04

## 2020-08-04 RX ORDER — MINERAL OIL/PETROLATUM,WHITE 3 %-94 %
OINTMENT (GRAM) OPHTHALMIC (EYE)
Qty: 3.5 G | Refills: 0 | Status: SHIPPED | OUTPATIENT
Start: 2020-08-04 | End: 2022-04-18

## 2020-08-04 RX ORDER — PREDNISONE 20 MG/1
TABLET ORAL
Status: COMPLETED
Start: 2020-08-04 | End: 2020-08-04

## 2020-08-04 RX ORDER — PREDNISONE 10 MG/1
60 TABLET ORAL DAILY
Qty: 42 TABLET | Refills: 0 | Status: SHIPPED | OUTPATIENT
Start: 2020-08-04 | End: 2020-08-11

## 2020-08-04 RX ORDER — ACYCLOVIR 200 MG/1
800 CAPSULE ORAL ONCE
Status: COMPLETED | OUTPATIENT
Start: 2020-08-04 | End: 2020-08-04

## 2020-08-04 RX ORDER — ACYCLOVIR 200 MG/1
CAPSULE ORAL
Status: COMPLETED
Start: 2020-08-04 | End: 2020-08-04

## 2020-08-04 RX ORDER — POLYETHYLENE GLYCOL, PROPYLENE GLYCOL .4; .3 G/100ML; G/100ML
2 LIQUID OPHTHALMIC PRN
Qty: 10 ML | Refills: 0 | Status: SHIPPED | OUTPATIENT
Start: 2020-08-04 | End: 2022-04-18

## 2020-08-04 RX ORDER — VALACYCLOVIR HYDROCHLORIDE 500 MG/1
1000 TABLET, FILM COATED ORAL 2 TIMES DAILY
Status: DISCONTINUED | OUTPATIENT
Start: 2020-08-04 | End: 2020-08-04

## 2020-08-04 RX ADMIN — PREDNISONE 60 MG: 20 TABLET ORAL at 19:37

## 2020-08-04 RX ADMIN — ACYCLOVIR 800 MG: 200 CAPSULE ORAL at 19:38

## 2020-08-04 ASSESSMENT — ENCOUNTER SYMPTOMS
VOMITING: 0
SINUS PAIN: 0
FACIAL SWELLING: 0
CONSTIPATION: 0
COLOR CHANGE: 0
EYE ITCHING: 0
SORE THROAT: 0
NAUSEA: 0
VOICE CHANGE: 0
COUGH: 0
RESPIRATORY NEGATIVE: 1
RHINORRHEA: 0
EYE REDNESS: 0
PHOTOPHOBIA: 0
EYE DISCHARGE: 1
SINUS PRESSURE: 0
SHORTNESS OF BREATH: 0
BACK PAIN: 0
TROUBLE SWALLOWING: 0
EYE PAIN: 0
ABDOMINAL PAIN: 0
DIARRHEA: 0

## 2020-08-04 ASSESSMENT — PAIN SCALES - GENERAL: PAINLEVEL_OUTOF10: 8

## 2020-08-04 ASSESSMENT — PAIN DESCRIPTION - LOCATION: LOCATION: MOUTH

## 2020-08-04 ASSESSMENT — PAIN DESCRIPTION - PAIN TYPE: TYPE: ACUTE PAIN

## 2020-08-04 NOTE — ED PROVIDER NOTES
[address] [facility]        Pt Name: Amy Russell  MRN: [id number]  [place] 2000  Date of evaluation: 8/4/2020  Provider: CURTIS Biswas  PCP: No primary care provider on file. I have seen and evaluated this patient with my supervising physician [id number] [facility]       Chief Complaint   Patient presents with    Oral Swelling     pt has lower lip swelling and pain, lip pulls to left and unable to close right eye since yesterday       HISTORY OF PRESENT ILLNESS   (Location, Timing/Onset, Context/Setting, Quality, Duration, Modifying Factors, Severity, Associated Signs and Symptoms)  Note limiting factors. Amy Russell is a 22 y.o. female with no significant past medical history who presents to the ED with nursing triage note as oral swelling. Patient denies any swelling to the lip but states she feels like her lip does not move on the left. States she is unable to close her left eye. Patient states this started last night. Patient denies history of similar symptoms in the past.  Denies headache, visual changes, speech disturbances, numbness/tingling, lightheadedness/dizziness, other weakness throughout. Denies chest pain, shortness of breath, abdominal pain, nausea/vomiting, urinary symptoms or changes in bowel movements. Denies fever chills. Denies sick contacts or recent travel. Denies rashes or lesions. Patient states she does have tearing to the left eye. Denies any pain to the left eye. Denies any eye redness. Denies any foreign body sensation. Nursing Notes were all reviewed and agreed with or any disagreements were addressed in the HPI. REVIEW OF SYSTEMS    (2-9 systems for level 4, 10 or more for level 5)     Review of Systems   Constitutional: Negative for activity change, appetite change, chills and fever.    HENT: Negative for congestion, drooling, ear discharge, ear pain, facial swelling, hearing loss, mouth sores, postnasal drip, rhinorrhea, sinus pressure, sinus pain, sore throat, trouble swallowing and voice change. Eyes: Positive for discharge. Negative for photophobia, pain, redness, itching and visual disturbance. Respiratory: Negative. Negative for cough and shortness of breath. Cardiovascular: Negative. Negative for chest pain. Gastrointestinal: Negative for abdominal pain, constipation, diarrhea, nausea and vomiting. Genitourinary: Negative for difficulty urinating and dysuria. Musculoskeletal: Negative for arthralgias, back pain, myalgias, neck pain and neck stiffness. Skin: Negative for color change, pallor, rash and wound. Neurological: Positive for facial asymmetry and weakness. Negative for dizziness, tremors, seizures, syncope, speech difficulty, light-headedness, numbness and headaches. Positives and Pertinent negatives as per HPI. Except as noted above in the ROS, all other systems were reviewed and negative. PAST MEDICAL HISTORY     Past Medical History:   Diagnosis Date    Asthma     childhood         SURGICAL HISTORY   History reviewed. No pertinent surgical history. [address]       Discharge Medication List as of 8/4/2020  7:43 PM      CONTINUE these medications which have NOT CHANGED    Details   ibuprofen (ADVIL;MOTRIN) 800 MG tablet Take 1 tablet by mouth every 8 hours as needed for Pain, Disp-30 tablet, R-0Print      Prenatal MV-Min-Fe Fum-FA-DHA (PRENATAL 1 PO) Take 1 tablet by mouthHistorical Med      FLUoxetine (PROZAC) 20 MG capsule Take 1 capsule by mouth daily, Disp-30 capsule, R-3Print      norethindrone (ORTHO MICRONOR) 0.35 MG tablet Take 1 tablet by mouth daily, Disp-28 tablet, R-3Print      acetaminophen (APAP EXTRA STRENGTH) 500 MG tablet Take 2 tablets by mouth every 6 hours as needed for Pain, Disp-30 tablet, R-0Print               ALLERGIES     Patient has no known allergies.     FAMILYHISTORY Family History   Problem Relation Age of Onset    Diabetes Maternal Grandmother           SOCIAL HISTORY       Social History     Tobacco Use    Smoking status: Never Smoker    Smokeless tobacco: Never Used   Substance Use Topics    Alcohol use: No    Drug use: No       SCREENINGS             PHYSICAL EXAM    (up to 7 for level 4, 8 or more for level 5)     ED Triage Vitals [08/04/20 1720]   BP Temp Temp Source Pulse Resp SpO2 Height Weight   126/74 97.6 °F (36.4 °C) Temporal 78 16 99 % 5' 6\" (1.676 m) 166 lb (75.3 kg)       Physical Exam  Constitutional:       General: She is not in acute distress. Appearance: Normal appearance. She is well-developed. She is not ill-appearing, toxic-appearing or diaphoretic. HENT:      Head: Normocephalic and atraumatic. Right Ear: External ear normal.      Left Ear: External ear normal.   Eyes:      General: Lids are normal. No visual field deficit. Right eye: No discharge. Left eye: No discharge. Extraocular Movements: Extraocular movements intact. Conjunctiva/sclera: Conjunctivae normal.      Right eye: Right conjunctiva is not injected. No chemosis, exudate or hemorrhage. Left eye: Left conjunctiva is not injected. No chemosis, exudate or hemorrhage. Pupils: Pupils are equal, round, and reactive to light. Comments: Unable to close the left eye. No conjunctival injection noted. No drainage noted. EOMs intact. PERRLA. Visual acuity visual fields grossly intact. Neck:      Musculoskeletal: Normal range of motion and neck supple. Cardiovascular:      Rate and Rhythm: Normal rate and regular rhythm. Pulses: Normal pulses. Heart sounds: Normal heart sounds. No murmur. No friction rub. No gallop. Pulmonary:      Effort: Pulmonary effort is normal. No respiratory distress. Breath sounds: Normal breath sounds. No stridor. No wheezing, rhonchi or rales. Chest:      Chest wall: No tenderness. Abdominal:      General: Abdomen is flat. Bowel sounds are normal. There is no distension. Palpations: Abdomen is soft. There is no mass. Tenderness: There is no abdominal tenderness. There is no right CVA tenderness, left CVA tenderness, guarding or rebound. Hernia: No hernia is present. Musculoskeletal: Normal range of motion. Skin:     General: Skin is warm and dry. Coloration: Skin is not pale. Findings: No erythema or rash. Neurological:      General: No focal deficit present. Mental Status: She is alert and oriented to person, place, and time. GCS: GCS eye subscore is 4. GCS verbal subscore is 5. GCS motor subscore is 6. Cranial Nerves: Facial asymmetry present. No dysarthria. Sensory: No sensory deficit. Motor: Motor function is intact. Comments: Gait normal.  Left-sided facial droop noted. Unable to close the left eye. Unable to wrinkle the left forehead. Sensation intact to the upper extremities and lower extremities throughout. No weakness to the upper or lower extremities throughout. Distal neurovascular intact. Gait normal.  Cranial nerves II through XII otherwise intact. Psychiatric:         Behavior: Behavior normal.         DIAGNOSTIC RESULTS   LABS:    Labs Reviewed - No data to display    All other labs were within normal range or not returned as of this dictation. EKG: All EKG's are interpreted by the Emergency Department Physician in the absence of a cardiologist.  Please see their note for interpretation of EKG. RADIOLOGY:   Non-plain film images such as CT, Ultrasound and MRI are read by the radiologist. Plain radiographic images are visualized and preliminarily interpreted by the ED Provider with the below findings:        Interpretation per the Radiologist below, if available at the time of this note:    No orders to display     No results found.         PROCEDURES   Unless otherwise noted below, none Procedures    CRITICAL CARE TIME   N/A    CONSULTS:  None      EMERGENCY DEPARTMENT COURSE and DIFFERENTIAL DIAGNOSIS/MDM:   Vitals:    Vitals:    08/04/20 1720 08/04/20 1939   BP: 126/74 117/69   Pulse: 78 78   Resp: 16    Temp: 97.6 °F (36.4 °C)    TempSrc: Temporal    SpO2: 99% 99%   Weight: 166 lb (75.3 kg)    Height: 5' 6\" (1.676 m)        Patient was given the following medications:  Medications   predniSONE (DELTASONE) tablet 60 mg (60 mg Oral Given 8/4/20 1937)   acyclovir (ZOVIRAX) capsule 800 mg (800 mg Oral Given 8/4/20 1938)           Patient is a 25-year-old female who presents the ED with complaint of left-sided facial weakness. Upon examination patient unable to close left eye with left-sided facial droop and inability to wrinkle the left-sided forehead. Has what appears to be Bell's palsy. Otherwise neurologically intact. Do not believe further work-up or imaging indicated this time. Patient given first dose prednisone and antiviral here in the emergency department. Will discharge home with prednisone, valacyclovir, lubricating eyedrops and lubricating eye ointment. Instructed tape eyelid shut at night with lubricating eye ointment in place to prevent corneal abrasion/ulcer. Instructed that weakness may not improve entirely. Follow-up with PCP and  Ophthalmology. Return to the ED for any worsening symptoms. Low suspicion for CVA, TIA, subarachnoid hemorrhage, subdural hematoma, meningitis, encephalitis, temporal arteritis, atypical migraine, acute glaucoma, corneal abrasion/ulcer at this time, electrode abnormality, cardiac arrhythmia or other emergent etiology at this time. FINAL IMPRESSION      1.  Bell's palsy          DISPOSITION/PLAN   DISPOSITION Decision To Discharge 08/04/2020 07:14:23 PM      PATIENT REFERREDTO:  [facility] Emergency Department  [address] [place] [address]  [phone]  Go to   As needed, If symptoms worsen    [facility] [address] [facility]    Schedule an appointment as soon as possible for a visit   For a Re-check in  3-5    days.     [facility]  [phone]          DISCHARGE MEDICATIONS:  Discharge Medication List as of 8/4/2020  7:43 PM      START taking these medications    Details   polyethyl glycol-propyl glycol 0.4-0.3 % (LUBRICATING EYE DROPS) 0.4-0.3 % ophthalmic solution Place 2 drops into the left eye as needed for Dry Eyes, Disp-10 mL,R-0Print      White Petrolatum-Mineral Oil ([facility] LUBRICATING EYE/OVERNIGHT) OINT Apply to left eye nightly., Disp-3.5 g,R-0Print      valACYclovir (VALTREX) 1 g tablet Take 1 tablet by mouth 3 times daily for 7 days, Disp-21 tablet,R-0Print      predniSONE (DELTASONE) 10 MG tablet Take 6 tablets by mouth daily for 7 doses, Disp-42 tablet,R-0Print             DISCONTINUED MEDICATIONS:  Discharge Medication List as of 8/4/2020  7:43 PM                 (Please note that portions of this note were completed with a voice recognition program.  Efforts were made to edit the dictations but occasionally words are mis-transcribed.)    CURTIS Ch (electronically signed)          CURTIS Madera  08/04/20 2041

## 2020-08-04 NOTE — ED NOTES
Nursing Discharge Notes:  -Patient discharged at this time in no acute distress after verbalizing understanding of discharge instructions.  -A copy of the AVS was reviewed with pt.  -Pt received applicable scripts which were reviewed with pt by this RN. -Pt was given the opportunity to ask questions before signing for discharge.    -Pt left ambulatory to lobby / discharge area. Patient Education:  Learner - Patient. Motivation and Readiness To Learn - Medium to High  Barriers To Learning - None  Learning Preference / Provided Instructions - Both written and verbal discharge instructions.        Loralee Paget, RN  08/04/20 [id number]

## 2020-08-05 NOTE — ED PROVIDER NOTES
I independently performed a history and physical on [address] [place] [address]. All diagnostic, treatment, and disposition decisions were made by myself in conjunction with the advanced practice provider. Briefly, this is a 22 y.o. female here for left-sided facial droop and inability to close her left eye which began about 24 hours ago and has been worsening. Denies history of similar. She denies history of hypertension, high cholesterol, diabetes or tobacco use. Associated with left ear pain. .    On exam, Patient afebrile and nontoxic. No distress. Heart RRR. Lungs CTAB. Bilateral tympanic membranes nonerythematous, nonbulging, landmarks visible, no erythema or vesicles of EAC. A&Ox4. Left sided facial droop, inability to raise left eyebrow or close left eye, loss of forehead creases left side, 5/5 motor and sensation grossly intact all extremities. No pronator drift. Normal finger to nose. Normal gait observed. .        Screenings            MDM    Patient afebrile and nontoxic. No distress. Neuro exam consistent with Bell's palsy. Patient is otherwise healthy young female with no risk factors for CVA. Nothing to suggest bacterial infection. No evidence of Eaton Bocanegra. Findings discussed with patient at length, will start steroids and antiviral.  Counseled importance of close PCP follow-up and patient verbalized understanding. Patient Referrals:  [facility]  [address] [facility]  [phone]  Go to   As needed, If symptoms worsen    [facility] [address]    Schedule an appointment as soon as possible for a visit   For a Re-check in  3-5    days.     [facility]  [phone]          Discharge Medications:  Discharge Medication List as of 8/4/2020  7:43 PM      START taking these medications    Details   polyethyl glycol-propyl glycol 0.4-0.3 % (LUBRICATING EYE DROPS) 0.4-0.3 % ophthalmic solution Place 2 drops into the left eye as needed for Dry Eyes, Disp-10 mL,R-0Print      White Petrolatum-Mineral Oil ([facility] LUBRICATING EYE/OVERNIGHT) OINT Apply to left eye nightly., Disp-3.5 g,R-0Print      valACYclovir (VALTREX) 1 g tablet Take 1 tablet by mouth 3 times daily for 7 days, Disp-21 tablet,R-0Print      predniSONE (DELTASONE) 10 MG tablet Take 6 tablets by mouth daily for 7 doses, Disp-42 tablet,R-0Print             FINAL IMPRESSION  1. Bell's palsy        Blood pressure 117/69, pulse 78, temperature 97.6 °F (36.4 °C), temperature source Temporal, resp. rate 16, height 5' 6\" (1.676 m), weight 166 lb (75.3 kg), last menstrual period 07/28/2020, SpO2 99 %, currently breastfeeding. For further details of [place] [address] emergency department encounter, please see documentation by advanced practice provider, CURTIS Leung.     Sandro Villanueva DO (electronically signed)  Attending Emergency Physician       Sandro Villanueva DO  08/04/20 5372

## 2022-04-18 ENCOUNTER — OFFICE VISIT (OUTPATIENT)
Dept: PRIMARY CARE CLINIC | Age: 22
End: 2022-04-18

## 2022-04-18 VITALS
TEMPERATURE: 97.8 F | BODY MASS INDEX: 31.89 KG/M2 | OXYGEN SATURATION: 95 % | HEART RATE: 77 BPM | HEIGHT: 63 IN | DIASTOLIC BLOOD PRESSURE: 60 MMHG | SYSTOLIC BLOOD PRESSURE: 108 MMHG | WEIGHT: 180 LBS

## 2022-04-18 DIAGNOSIS — Z86.69 HISTORY OF BELL'S PALSY: ICD-10-CM

## 2022-04-18 DIAGNOSIS — Z76.89 ENCOUNTER TO ESTABLISH CARE WITH NEW DOCTOR: ICD-10-CM

## 2022-04-18 DIAGNOSIS — M25.531 RIGHT WRIST PAIN: ICD-10-CM

## 2022-04-18 DIAGNOSIS — E66.9 OBESITY, CLASS I, BMI 30-34.9: ICD-10-CM

## 2022-04-18 DIAGNOSIS — F32.9 REACTIVE DEPRESSION: Primary | ICD-10-CM

## 2022-04-18 PROCEDURE — 99203 OFFICE O/P NEW LOW 30 MIN: CPT

## 2022-04-18 RX ORDER — ETONOGESTREL 68 MG/1
68 IMPLANT SUBCUTANEOUS
COMMUNITY
Start: 2020-02-07

## 2022-04-18 RX ORDER — IBUPROFEN 600 MG/1
600 TABLET ORAL 4 TIMES DAILY PRN
Qty: 120 TABLET | Refills: 0 | Status: SHIPPED | OUTPATIENT
Start: 2022-04-18

## 2022-04-18 RX ORDER — FLUOXETINE 10 MG/1
10 CAPSULE ORAL DAILY
Qty: 30 CAPSULE | Refills: 3 | Status: SHIPPED | OUTPATIENT
Start: 2022-04-18

## 2022-04-18 ASSESSMENT — ENCOUNTER SYMPTOMS
CHEST TIGHTNESS: 0
SHORTNESS OF BREATH: 0
COUGH: 0
ABDOMINAL PAIN: 0
NAUSEA: 0
WHEEZING: 0
BLOOD IN STOOL: 0
DIARRHEA: 0
VOMITING: 0

## 2022-04-18 ASSESSMENT — PATIENT HEALTH QUESTIONNAIRE - PHQ9
9. THOUGHTS THAT YOU WOULD BE BETTER OFF DEAD, OR OF HURTING YOURSELF: 0
SUM OF ALL RESPONSES TO PHQ9 QUESTIONS 1 & 2: 4
SUM OF ALL RESPONSES TO PHQ QUESTIONS 1-9: 20
4. FEELING TIRED OR HAVING LITTLE ENERGY: 3
8. MOVING OR SPEAKING SO SLOWLY THAT OTHER PEOPLE COULD HAVE NOTICED. OR THE OPPOSITE, BEING SO FIGETY OR RESTLESS THAT YOU HAVE BEEN MOVING AROUND A LOT MORE THAN USUAL: 3
7. TROUBLE CONCENTRATING ON THINGS, SUCH AS READING THE NEWSPAPER OR WATCHING TELEVISION: 3
6. FEELING BAD ABOUT YOURSELF - OR THAT YOU ARE A FAILURE OR HAVE LET YOURSELF OR YOUR FAMILY DOWN: 3
3. TROUBLE FALLING OR STAYING ASLEEP: 1
10. IF YOU CHECKED OFF ANY PROBLEMS, HOW DIFFICULT HAVE THESE PROBLEMS MADE IT FOR YOU TO DO YOUR WORK, TAKE CARE OF THINGS AT HOME, OR GET ALONG WITH OTHER PEOPLE: 2
SUM OF ALL RESPONSES TO PHQ QUESTIONS 1-9: 20
5. POOR APPETITE OR OVEREATING: 3
2. FEELING DOWN, DEPRESSED OR HOPELESS: 3
SUM OF ALL RESPONSES TO PHQ QUESTIONS 1-9: 20
SUM OF ALL RESPONSES TO PHQ QUESTIONS 1-9: 20
1. LITTLE INTEREST OR PLEASURE IN DOING THINGS: 1

## 2022-04-18 ASSESSMENT — COLUMBIA-SUICIDE SEVERITY RATING SCALE - C-SSRS
2. HAVE YOU ACTUALLY HAD ANY THOUGHTS OF KILLING YOURSELF?: YES
1. WITHIN THE PAST MONTH, HAVE YOU WISHED YOU WERE DEAD OR WISHED YOU COULD GO TO SLEEP AND NOT WAKE UP?: YES
4. HAVE YOU HAD THESE THOUGHTS AND HAD SOME INTENTION OF ACTING ON THEM?: NO
6. HAVE YOU EVER DONE ANYTHING, STARTED TO DO ANYTHING, OR PREPARED TO DO ANYTHING TO END YOUR LIFE?: NO
5. HAVE YOU STARTED TO WORK OUT OR WORKED OUT THE DETAILS OF HOW TO KILL YOURSELF? DO YOU INTEND TO CARRY OUT THIS PLAN?: YES
3. HAVE YOU BEEN THINKING ABOUT HOW YOU MIGHT KILL YOURSELF?: NO

## 2022-04-18 NOTE — ASSESSMENT & PLAN NOTE
Patient reports some residual, intermittent pains to left side of face, no paralysis or N/T. Encouraged patient to massage area and use heat pad as needed, take ibuprofen for pain.

## 2022-04-18 NOTE — ASSESSMENT & PLAN NOTE
Discussed calorie tracking and increasing physical activity to high intensity work outs that elevate heart rate, with goal of 150 minutes weekly - minimum. Discussed intermittent fasting, diets like Mediterranean and low carbohydrates. Increase water intake, eliminate sugary beverages. Last Body mass index is 32.4 kg/m².

## 2022-04-18 NOTE — PATIENT INSTRUCTIONS
Patient Education        Recuperación de la depresión: Instrucciones de cuidado  Recovering From Depression: Care Instructions  Instrucciones de cuidado     Tener un buen cuidado de usted mismo es importante a medida que se recupera de la depresión. Con el tiempo, a medida que el tratamiento funcione ariel síntomas desaparecerán. No lo abandone. Al contrario, concentre camilo energía enrecuperarse. Camilo estado de ánimo mejorará. Solo tomará un tiempo. Concéntrese en cosas que le pueden ayudar a sentirse mejor, shaq estar con amigos o familiares, comer marylin y descansar lo suficiente. Mimi tómese las cosas con tranquilidad. No hagamuchas actividades demasiado pronto. Alyse Chant a sentirse mejor poco a [place] de seguimiento es elba parte clave de camilo tratamiento y seguridad. Asegúrese de hacer y acudir a todas las citas, y llame a camilo médico si está teniendo problemas. También es elba buena idea saber los [place] de susexámenes y mantener elba lista de los medicamentos que chaim. ¿Cómo puede cuidarse en el hogar? Sea realista   Si debe hacer elba tarea que le llevará [place], [place] en varias etapas pequeñas que usted pueda manejar y david solo lo que pueda.  Es posible que [place] posponer las decisiones importantes hasta que se haya recuperado de la depresión. Si tiene planes que tendrán un gran impacto en camilo jordan, shaq casarse, divorciarse o cambiar de [place], intente esperar un poco. Háblelo con ariel amigos y seres queridos, quienes pueden ayudarle a analizar el panorama completo.  Es importante acercarse a las personas para pedirles ayuda. No se aísle. Deje que camilo agatha y [employer]. Encuentre a alguien en quien pueda confiar y hable con salud persona.  Sea paciente y bondadoso consigo mismo. Recuerde que la depresión no es camilo culpa y no es algo que usted pueda superar solo con fuerza de voluntad. El tratamiento es importante para la depresión, al igual que para cualquier otra enfermedad. Sentirse mejor lleva tiempo, y camilo estado de ánimo mejorará poco a poco.  Manténgase activo   Manténgase ocupado y [address]. Salga a caminar o intente con algún otro ejercicio suave.  Consulte a camilo médico acerca de algún programa de ejercicios. El ejercicio le puede ayudar a aliviar la depresión leve.  Vaya al cine o a un concierto. Participe en alguna actividad de la maxim o [facility] reunión social. Elba Kennel a walt a un partido de fútbol.  Pídale a un amigo que cene con usted. Cuídese   Siga elba dieta equilibrada con muchas frutas y verduras frescas, granos integrales y bernstein magras de proteínas. Si perdió el apetito, coma pequeños refrigerios en lugar de comidas abundantes.  Evite el consumo de drogas ilegales o marihuana y no tonie alcohol. No tome medicamentos que no le hayan sido recetados a usted. Estos podrían interferir con los medicamentos que pudiera estar tomando para la depresión, o podrían empeorar la depresión. [employer] medicamentos tierney shaq le fueron recetados. Usted podría empezar a sentirse mejor entre 1 y 3 semanas de estar tomando los antidepresivos. Mimi puede necesitar hasta 6 u 8 semanas para walt más mejoría. Hable con camilo médico si tiene preguntas o inquietudes acerca de ariel medicamentos, o si no observa ninguna mejoría en 3 semanas.  Siga tomando camilo medicamento después de que ariel síntomas mejoren. Ronni camilo medicamento roldan al menos 6 meses después de sentirse mejor puede ayudarle a evitar deprimirse de nuevo. Si no es la primera vez que ha estado deprimido, el médico podría recomendarle que tome camilo medicamento roldan incluso más [place].  Informe a camilo médico si tiene efectos secundarios causados por camilo medicamento. Muchos efectos secundarios son leves y desaparecerán por sí solos después de que haya tomado el medicamento roldan algunas semanas. Algunos pueden durar [employer]. Hable con camilo médico si los efectos secundarios son demasiado molestos.  Amarjit Olson probar un medicamento diferente.  Continúe con el asesoramiento. Puede ayudar a evitar que vuelva a tener depresión, especialmente si ha tenido múltiples episodios de depresión. Hable con camilo consejero si tiene dificultad para asistir a ariel sesiones o si james que las sesiones no le están funcionando. No deje de acudir.  Duerma lo suficiente. Hable con camilo médico si tiene problemas para dormir.  Evite las pastillas para dormir a menos que hayan sido recetadas por el médico que está tratando camilo depresión. Las pastillas para dormir podrían hacerlo sentir aturdido roldan el día e interactuar con otros medicamentos que tome.  Si tiene [place] enfermedad, shaq diabetes, enfermedad del corazón o presión arterial jessica, asegúrese de continuar con camilo tratamiento. Hable con camilo médico acerca de todos los medicamentos que chaim, incluyendo aquellos con o sin receta.  Si usted o alguien a quien conoce habla acerca de suicidarse, autolesionarse o sentirse desesperado, busque ayuda de inmediato. Llame a la [place] nacional para la prevención del suicidio al [phone] ([phone]) o envíe un mensaje de texto [facility] al [phone] para acceder a la [place] de mensajes de texto en casos de crisis. Considere guardar estos números en camilo teléfono. ¿Cuándo debe pedir ayuda? Llame al 911 en cualquier momento que considere que necesita atención de [place]. Por ejemplo, llame si:     Siente ganas de lastimarse a sí mismo o a otra persona.      Alguien que conoce está deprimido y está intentando suicidarse o está a punto de hacerlo. Llame a camilo médico ahora mismo o busque atención médica inmediata si:     Oye voces.      Alguien que usted conoce está deprimido y:  ? Keny Noble a regalar ariel posesiones. ? Usa drogas ilegales o afshan alcohol en exceso. ? Habla o escribe acerca de la muerte, lo que incluye notas de suicidio o [address] kari de juan antonio, cuchillos o pastillas.   ? Keny Noble a pasar mucho tiempo a selectivo de la recaptación deserotonina (SSRI, por ariel siglas en inglés). Fluoxetine se [place] para tratar el trastorno depresivo natalee, bulimia nerviosa (problemas con el comer), trastorno obsesivo-compulsivo, pánico, y trastornodisfórico premenstrual (PMDD, por ariel siglas en inglés). Fluoxetine algunas veces se [place] junto con olanzapine (Zyprexa) para el tratamiento de la depresión maníaca causada por un trastorno bipolar. Esta combinación también se [place] para el tratamiento de la depresión después que almenos otros 2 medicamentos collado fallado. Si usted también chaim olanzapine (Zyprexa), gabriella la guía del medicamento de Zyprexa y todas las advertencias einstrucciones que vienen con hamida medicamento. Fluoxetine puede también utilizarse para fines no mencionados en esta guía delmedicamento. ¿Qué cuestiones debería preguntar al profesional de la rut antes de carlin fluoxetine? Usted no debe usar fluoxetine si es alérgico a éste, si también chaim pimozide othioridazine. No utilice fluoxetine si usted ha usado un inhibidor de MAO en los últimos 14 días. Megha interacción peligrosa de medicamentos puede ocurrir. Los inhibidores de la MAO incluyen isocarboxazid, linezolid, inyección de oscar de metileno, phenelzine, rasagiline, selegiline, y tranylcypromine. Usted debe esperar por lo menos 14 días después de dejar de carlin un inhibidor de la MAO antes de carlin fluoxetine. Después de parar de carlin fluoxetine, usted debe esperar por lo menos 5 semanas antes de empezar a carlin un IMAO. Dígale a camilo médico acerca de todos los [place] usted use, especialmente Celexa, Cymbalta, Desyrel, Effexor, Lexapro, Luvox, Oleptro,Paxil, Pexeva, Symbyax, Viibryd, o Zoloft.   Informe a camilo médico si Ed Satish lopez tenido:   cirrosis del hígado;   problemas al orinar;   diabetes;   glaucoma de ángulo cerrado;   convulsiones o epilepsia;   problemas sexuales;   enfermedad bipolar (depresión maníaca);   abuso de drogas o pensamientos de suicidio; o   terapia electroconvulsiva (ECT por ariel siglas en inglés). Algunos jóvenes tienen pensamientos de suicidio cuando comienzan a ronni un antidepresivo por primera vez. Camilo médico tendrá que evaluar camilo progreso en visitas periódicas mientras usted esté usando fluoxetine. Camilo agatha o quieneslo cuidan también deben Judd Schuler a los cambios en camilo humor o síntomas. Los M/A-COM pueden ser más sensibles a los efectos de John. Pregúntele a camilo médico acerca de ronni esta medicina si usted embarazada. Ronni un antidepresivo SSRI roldan el fin del embarazo puede causar complicaciones médicas serias en el [facility]. Sin embargo, usted puede tener elba recurrencia de la depresión si veronica de ronni camilo antidepresivo. Dígale de inmediato a camilo médico si Darroll Flores. Si usted [place], camilo nombre tierney vez sea enlistado en un registro de embarazos para seguir los efectos defluoxetine en el [facility]. Si está amamantando, informe a camilo médico si nota agitación, inquietud,problemas de alimentación, o poco aumento de [employer] [facility]. Fluoxetine no está aprobada para usarse en cualquier persona cadence de 18 [employer]. ¿Cómo jamar ronni fluoxetine? Siga todas las instrucciones en la etiqueta de cmailo prescripción y gabriella todas las guías del medicamento o las hojas de instrucción. Lethea Leny camilo médico enocasiones cambie camilo dosis. Use la medicina exactamente shaq indicado. Trague la cápsula de liberación retardada entera y no la triture, la [place], la rompa, o la yonny. Mida el medicamento líquido con cuidado. Use la jeringa de medición que viene con camilo medicina, o use undispositivo para la medición de dosis (no elba cuchara casera). Puede ronni hasta 4 semanas antes de que ariel síntomas mejoren. Siga usando elmedicamento shaq indicado y dígale a camilo médico si ariel síntomas no mejoran.   Informe a camilo médico si tiene algún cambio en la función sexual, shaq pérdida de interés en las relaciones sexuales, problemas para tener un orgasmo o (en los hombres) problemas con la erección o la eyaculación. Algunosproblemas sexuales pueden tratarse. No deje de usar fluoxetine de forma repentina, o podría tener síntomas desagradables de abstinencia. Consulte con camilo médicocomo dejar de carlin fluoxetine de forma clark. Manténgalo a temperatura ambiente, fuera de la humedad y del calor. ¿Qué sucede si me sary elba dosis? [place] la medicina tan pronto pueda, krystal sáltese la dosis que dejó de carlin si ya nano es hora para la próxima dosis. No tome dos dosis a la vez. Si se olvida de carlin elba dosis de Prozac Weekly, tome la dosis que dejó de carlin rodriguez pronto se acuerde y tome la siguiente dosis 7 días más tarde. Sin embargo, si ya es hora de carlin camilo próxima dosis semanal, espere y tome la siguiente dosis shaq indicado. No tome más medicina para alcanzar la dosis que dejó de carlin. [place] en ebla sobredosis? Busque atención médica de emergencia o llame a la línea de Poison Help [phone].  ¿Qué jamar evitar mientras kendra fluoxetine? Beber alcohol puede aumentar ciertos efectos secundarios de fluoxetine. Evite manejar o actividades peligrosas antes de saber cómo esta medicina leafectará. Ariel reacciones pueden estar perjudicadas. ¿Cuáles son los posibles efectos secundarios de fluoxetine? Busque atención médica de emergencia si observa signos de elba reacción alérgica (ronchas, dificultad para respirar, hinchazón en la robin o garganta) o elba reacción severa de la piel (fiebre, dolor de garganta, quemazón en ariel ojos, dolor de la piel, sarpullidorojo o púrpura con ampollas y descamación).   Informe a camilo médico de cualquier síntoma nuevo o que [address], shaq: cambios del humor o del comportamiento, ansiedad, ataques de pánico, dificultad para dormir, o si se siente impulsivo, irritado, agitado, hostil, agresivo, inquieto, hiperactivo (mentalmente o físicamente), más deprimido, opiensa en suicidarse o hacerse daño. Llame a camilo médico de inmediato si usted tiene:   visión borrosa, visión de túnel, dolor o hinchazón de los ojos, o walt halos alrededor Parveen Weir;   latidos cardíacos rápidos o shayna, aleteo cardíaco en camilo pecho, falta de aire al respirar, y mareo repentino (shaq que se puede desmayar);   niveles bajos de sodio en el cuerpo --dolor de henry, confusión, habla arrastrada, debilidad severa, vómitos, pérdida de coordinación, sensación de inestabilidad; o   reacción severa del sistema nervioso --músculos muy tiesos (rígidos), fiebre jessica, sudoración, confusión, latidos cardíacos rápidos o desiguales, temblores, sensación de que se puede desmayar. Busque atención médica de inmediato si tiene síntomas asociados al síndrome de la serotonina, shaq:  agitación, alucinaciones, fiebre, sudoración, temblores, latido cardíaco rápido, rigidez muscular, espasmo, pérdida de coordinación, náusea, vómito, odiarrea. Los efectos secundarios comunes pueden incluir:   problemas para dormir (insomnio), sueños extraños;   dolor de henry, mareo, somnolencia, cambios de la visión;   temblores o sacudidas, sentirse ansioso o nervioso;   dolor, debilidad, bostezos, sensación de cansancio;   malestar estomacal, pérdida del apetito, náusea, vómitos, diarrea;   boca seca, sudoración, calores menopáusicos;   cambios en camilo peso o apetito;   nariz congestionada, dolor de sinusitis, dolor de garganta, síntomas de la gripe; o   disminución del deseo sexual, impotencia, o dificultad para tener un orgasmo. Esta lista no incluye todos los efectos secundarios y es posible que aparezcan otros síntomas. Llame a camilo médico para obtener consejos e información médica relacionada con estos efectos secundarios. También puede informar acerca deefectos secundarios llamando al FDA al 1-800-FDA-1088. ¿Qué otros fármacos afectarán a fluoxetine? Fluoxetine puede provocar un problema cardíaco grave.  El riesgo puede [place] asimismo determinados medicamentos para tratar infecciones, asma, problemas cardíacos, presión arterial jessica,depresión, enfermedad mental, cáncer, malaria o el VIH. La utilización fluoxetine junto con otros fármacos que le causen somnolencia puede empeorar joey [place]. Consulte con camilo médico antes de utilizar un opioide, elba pastilla para dormir, un relajante muscular, o medicamentos parala ansiedad o las convulsiones. Consulte con camilo médico antes de carlin un antiinflamatorio no esteroide (CHELO, NSAID por ariel siglas en inglés) shaq aspirin, ibuprofen (Advil, Motrin), naproxen (Aleve), celecoxib (Celebrex), diclofenac, indomethacin, meloxicam, yotros. Usar un CHELO con fluoxetine puede causarle moretones o sangrado fácil. Informe a camilo Qubit que chaim actualmente. Muchos fármacos pueden afectar a fluoxetine, especialmente:   cualquier otro antidepresivo;   Alek's Wort;   tryptophan (a veces llamado L-tryptophan);   un anticoagulante --warfarin, Coumadin, Jantoven;   medicina para tratar la ansiedad, los trastornos del [place] de ánimo, los trastornos del pensamiento o las enfermedades mentales --amitriptyline, buspirone, desipramine, lithium, nortriptyline, y [address];   medicina para tratar el TDAH (ADHD por ariel siglas en inglés), o la narcolepsia --Adderall, Concerta, Ritalin, [place], Zenzedi y [address]   medicina para los carmita de henry por migraña --rizatriptan, sumatriptan, zolmitriptan, y [address] o   medicina narcótica para el dolor --fentanyl, tramadol. Esta lista no está completa y muchos otros fármacos pueden afectar a fluoxetine. [place] incluye las medicinas que se obtienen con o sin receta, vitaminas, yproductos herbarios. No todas las interacciones posibles se enumeran aquí. ¿Dónde puedo obtener más información? Camilo farmacéutico puede ofrecerle más información acerca de fluoxetine.   Recuerde, [place] y todas las otras medicinas fuera del [address] [facility], no comparta nunca ariel medicinas con otros, y use "[employer]" solo para la condición por la que fue recetada. Se lopez hecho todo lo posible para que la información que proviene de [place] sea precisa, actual, y Ingrid Gannon no se hace garantía de tierney. La información sobre el medicamento incluida aquí puede tener nuevas recomendaciones. La información preparada por [employer] se ha creado para uso del profesional de la rut y para el consumidor en los [place] de [facility] ([place] y por lo cual [employer] no certifica que el uso fuera de los [place] sea apropiado, a menos que se mencione específicamente lo cual. La información de [employer] sobre drogas no sanciona drogas, ni diagnóstica al paciente o recomienda terapia. La información de [employer] sobre drogas sirve shaq elba jeff de información diseñada para la ayuda del profesional de la rut licenciado en el cuidado de ariel pacientes y/o para servir al consumidor que reciba joey servicio shaq un suplemento a, y no shaq sustituto de la competencia, experiencia, conocimiento y opinión del profesional de la rut. La ausencia en éste de elba advertencia para elba droga o combinación de drogas no debe, de ninguna forma, interpretarse shaq que la droga o la combinación de drogas mahesh seguras, [place], o apropiadas para cualquier paciente. [employer] no se responsabiliza por ningún aspecto del cuidado médico que reciba con la ayuda de la información que proviene de Lucie ulrich. La información incluida aquí no se ha creado con la intención de cubrir todos los usos posibles, instrucciones, precauciones, advertencias, interacciones con otras drogas, reacciones alérgicas, o efectos secundarios. Si usted tiene alguna pregunta acerca de lasdrogas que está tomando, consulte con camilo médico, [place], o farmacéutico.  Copyright 8385-6899 [employer] Version: 26.01. Revision date:11/9/2021.   Las instrucciones de cuidado fueron adaptadas bajo licencia por [facility] Si usted tiene [place] afección médica o sobre estas instrucciones, siempre pregunte a camilo profesional de rut. [facility] niega toda garantía o responsabilidad por camilo uso de esta información.

## 2022-04-18 NOTE — PROGRESS NOTES
Gwen Edwards (:  2000) is a 19 y.o. female,Established patient, here for evaluation of the following chief complaint(s):  Establish Care (Pt dx [place] Palsy . c/o Right hand pain 2 months that is continuos)      HPI  Patient presents to establish care with new provider. Patient complains of ongoing pain, N/T, and limited ROM in right hand and wrist - has previously seen orthopedics for same, referral made. Patient also complaining of left sided facial pain - believes to be related to [address] [place] [address] which she was dx with approx 2 years ago. Patient reports has had similar pains before which come and go on their own. Instructed to take ibuprofen as needed for pain, and to gently massage area with heat. Patient PHQ9 positive for depression, patient states recent change at home 2 months ago which led to depression - reluctant to discuss details at this time. Patient denies SI/HI at this time, but does report having plan in the past where she considered taking pills to overdose. Patient denies having adequate support system at home and became very tearful while discussing. ASSESSMENT/PLAN:  1. Reactive depression  Assessment & Plan:   Referral to counseling made. Will start Prozac 10mg, f/u 1 month to evaluate efficacy. Orders:  -     FLUoxetine (PROZAC) 10 MG capsule; Take 1 capsule by mouth daily, Disp-30 capsule, R-3Normal  -     Amb External Referral To Counseling Services  2. Right wrist pain  Assessment & Plan:  Patient given referral to orthopedics. Ibuprofen for pain management PRN. Orders:  -     ibuprofen (ADVIL;MOTRIN) 600 MG tablet; Take 1 tablet by mouth 4 times daily as needed for Pain, Disp-120 tablet, R-0Normal  -     [place], Orthopedic Surgery, [facility]  3. Encounter to establish care with new doctor  Assessment & Plan:   Labs ordered, will f/u with any abnormal results.   Orders:  -     CBC with Auto Differential; Future  -     Comprehensive Metabolic Panel; Future  -     Lipid, Fasting; Future  -     TSH; Future  -     Hemoglobin A1C; Future  4. History of Bell's palsy  Assessment & Plan:  Patient reports some residual, intermittent pains to left side of face, no paralysis or N/T. Encouraged patient to massage area and use heat pad as needed, take ibuprofen for pain. 5. Obesity, Class I, BMI 30-34.9  Assessment & Plan:  Discussed calorie tracking and increasing physical activity to high intensity work outs that elevate heart rate, with goal of 150 minutes weekly - minimum. Discussed intermittent fasting, diets like Mediterranean and low carbohydrates. Increase water intake, eliminate sugary beverages. Last Body mass index is 32.4 kg/m². /60   Pulse 77   Temp 97.8 °F (36.6 °C) (Infrared)   Ht 5' 2.5\" (1.588 m)   Wt 180 lb (81.6 kg)   LMP 03/23/2022   SpO2 95%   BMI 32.40 kg/m²      Subjective   SUBJECTIVE/OBJECTIVE:  Review of Systems   Constitutional: Negative for fatigue, fever and unexpected weight change. Respiratory: Negative for cough, chest tightness, shortness of breath and wheezing. Cardiovascular: Negative for chest pain, palpitations and leg swelling. Gastrointestinal: Negative for abdominal pain, blood in stool, diarrhea, nausea and vomiting. Neurological: Negative for dizziness, weakness and light-headedness. Psychiatric/Behavioral: Negative for self-injury and suicidal ideas. +depression, no SI/HI at this time       Physical Exam  Vitals and nursing note reviewed. Constitutional:       Appearance: Normal appearance. She is obese. Cardiovascular:      Rate and Rhythm: Normal rate and regular rhythm. Pulses: Normal pulses. Heart sounds: Normal heart sounds. Pulmonary:      Effort: Pulmonary effort is normal.      Breath sounds: Normal breath sounds. Musculoskeletal:         General: Normal range of motion. Skin:     General: Skin is warm and dry.    Neurological:      Mental Status: She is alert and oriented to person, place, and time. Mental status is at baseline. Psychiatric:         Behavior: Behavior normal.         Thought Content: Thought content normal.         Judgment: Judgment normal.      Comments: Patient tearful during assessment, +PHQ9 for depression         Current Outpatient Medications   Medication Sig Dispense Refill    etonogestrel (NEXPLANON) 68 MG implant Inject 68 mg into the skin      FLUoxetine (PROZAC) 10 MG capsule Take 1 capsule by mouth daily 30 capsule 3    ibuprofen (ADVIL;MOTRIN) 600 MG tablet Take 1 tablet by mouth 4 times daily as needed for Pain 120 tablet 0    acetaminophen (APAP EXTRA STRENGTH) 500 MG tablet Take 2 tablets by mouth every 6 hours as needed for Pain 30 tablet 0     No current facility-administered medications for this visit.        Return in about 4 weeks (around 5/16/2022) for Medication re-eval.    Electronically signed by PORFIRIO Preciado CNP on 4/18/2022 at 12:16 PM

## 2022-04-21 DIAGNOSIS — Z76.89 ENCOUNTER TO ESTABLISH CARE WITH NEW DOCTOR: ICD-10-CM

## 2022-04-21 LAB
A/G RATIO: 1.4 (ref 1.1–2.2)
ALBUMIN SERPL-MCNC: 4.2 G/DL (ref 3.4–5)
ALP BLD-CCNC: 75 U/L (ref 40–129)
ALT SERPL-CCNC: 22 U/L (ref 10–40)
ANION GAP SERPL CALCULATED.3IONS-SCNC: 14 MMOL/L (ref 3–16)
AST SERPL-CCNC: 15 U/L (ref 15–37)
BASOPHILS ABSOLUTE: 0 K/UL (ref 0–0.2)
BASOPHILS RELATIVE PERCENT: 0.3 %
BILIRUB SERPL-MCNC: 0.3 MG/DL (ref 0–1)
BUN BLDV-MCNC: 11 MG/DL (ref 7–20)
CALCIUM SERPL-MCNC: 9.3 MG/DL (ref 8.3–10.6)
CHLORIDE BLD-SCNC: 104 MMOL/L (ref 99–110)
CHOLESTEROL, FASTING: 179 MG/DL (ref 0–199)
CO2: 21 MMOL/L (ref 21–32)
CREAT SERPL-MCNC: 0.6 MG/DL (ref 0.6–1.1)
EOSINOPHILS ABSOLUTE: 0.1 K/UL (ref 0–0.6)
EOSINOPHILS RELATIVE PERCENT: 1.8 %
GFR AFRICAN AMERICAN: >60
GFR NON-AFRICAN AMERICAN: >60
GLUCOSE BLD-MCNC: 76 MG/DL (ref 70–99)
HCT VFR BLD CALC: 43 % (ref 36–48)
HDLC SERPL-MCNC: 51 MG/DL (ref 40–60)
HEMOGLOBIN: 14.2 G/DL (ref 12–16)
LDL CHOLESTEROL CALCULATED: 109 MG/DL
LYMPHOCYTES ABSOLUTE: 3 K/UL (ref 1–5.1)
LYMPHOCYTES RELATIVE PERCENT: 39.7 %
MCH RBC QN AUTO: 28.3 PG (ref 26–34)
MCHC RBC AUTO-ENTMCNC: 33 G/DL (ref 31–36)
MCV RBC AUTO: 85.8 FL (ref 80–100)
MONOCYTES ABSOLUTE: 0.5 K/UL (ref 0–1.3)
MONOCYTES RELATIVE PERCENT: 6.2 %
NEUTROPHILS ABSOLUTE: 4 K/UL (ref 1.7–7.7)
NEUTROPHILS RELATIVE PERCENT: 52 %
PDW BLD-RTO: 13.7 % (ref 12.4–15.4)
PLATELET # BLD: 266 K/UL (ref 135–450)
PMV BLD AUTO: 8.7 FL (ref 5–10.5)
POTASSIUM SERPL-SCNC: 4.1 MMOL/L (ref 3.5–5.1)
RBC # BLD: 5.01 M/UL (ref 4–5.2)
SODIUM BLD-SCNC: 139 MMOL/L (ref 136–145)
TOTAL PROTEIN: 7.2 G/DL (ref 6.4–8.2)
TRIGLYCERIDE, FASTING: 95 MG/DL (ref 0–150)
TSH SERPL DL<=0.05 MIU/L-ACNC: 3.36 UIU/ML (ref 0.27–4.2)
VLDLC SERPL CALC-MCNC: 19 MG/DL
WBC # BLD: 7.6 K/UL (ref 4–11)

## 2022-04-22 LAB
ESTIMATED AVERAGE GLUCOSE: 96.8 MG/DL
HBA1C MFR BLD: 5 %

## 2022-05-11 ENCOUNTER — OFFICE VISIT (OUTPATIENT)
Dept: ORTHOPEDIC SURGERY | Age: 22
End: 2022-05-11

## 2022-05-11 VITALS — WEIGHT: 180 LBS | HEIGHT: 63 IN | RESPIRATION RATE: 16 BRPM | BODY MASS INDEX: 31.89 KG/M2

## 2022-05-11 DIAGNOSIS — M25.531 RIGHT WRIST PAIN: Primary | ICD-10-CM

## 2022-05-11 PROCEDURE — 99244 OFF/OP CNSLTJ NEW/EST MOD 40: CPT | Performed by: ORTHOPAEDIC SURGERY

## 2022-05-11 NOTE — PATIENT INSTRUCTIONS
Hand Range of Motion Instructions      Dr. Digna Arshad    1. Be cautions in resuming fulll activities and use of the hand for next 2 - 4 weeks. 2. Perform the following exercises VIGOROUSLY at least four times a day. Exercises should be performed in the seated position with elbow on tabletop or other firm surface. If you cannot make these motions on your own, you may use other hand to assist in making these motions. A. Fully straighten fingers until hand is flat. Fully bend fingers until hand is in a full fist.   B. Bend wrist forward and backward (grasp hand around knuckles with other hand to do so). C. Rotate forearm so that your palm faces towards your face. Rotate forearm so that your palm faces away from your face (grasp hand around wrist with other hand to do so). D. Fully straighten elbow. Fully bend elbow. 3. Continue light use of the hand progressing to more normal us as it feels comfortable to do so. 4. In 2 - 4 weeks you may discontinue using the brace (if you were using one) and resume normal use of the hand and wrist if you have regained full and painless motion and function. 5. If you are unable to achieve  full and painless motion and function over 4 weeks, please call the office at [phone] to schedule a follow-up appointment with Dr. Alicia Heath. Thank you for choosing [facility]) Physicians for your Hand and Upper Extremity needs. If we can be of any further assistance to you, please do not hesitate to contact us.     Office Phone Number:  [phone]  or  [phone]

## 2022-05-11 NOTE — LETTER
[facility] SURGERY  Surgery Scheduling Form:      DEMOGRAPHICS:                                                                                                              .    Patient Name:  Lisa Edwards  Patient :  2000   Patient SS#:      Patient Phone:  [phone] (home)  Alt. Patient Phone:    Patient Address:  [address] [address] [address]    PCP:  PORFIRIO Flores CNP  Insurance:  Payor: / No coverage found. Insurance ID Number:    DIAGNOSIS & PROCEDURE:                                                                                            .    Diagnosis:   right Thumb CMC Joint Osteoarthritis  (715.14)  Operation:  right Thumb [facility] Joint Arthrodesis  [CPT: 65799]  Location:  [facility]  Surgeon:  Sundar Sarabia    SCHEDULING INFORMATION:                                                                                         .    Surgeon's Scheduling Instruction:  elective    RN Post-op Appt:  [] Yes   [x] No  Preferred Thursday:   [x] Yes   [] No    Requested Date:    OR Time:   Patient Arrival Time:    OR Time Required:  40  Minutes  Anesthesia:  General  Equipment:   K-Wires, [place] Osteotomes, TPS,  LOCKING MINI FRAGMENT SET/LOCKING MODULAR HAND SET  Mini C-Arm:  Yes   Standard C-Arm:  No  Status:  outpatient  PAT Required:  Yes  Comments:                      Crescencio Simmons MD  22 1:48 PM     BILLING INFORMATION:                                                                                                    .    Procedure:       CPT Code Modifier   right Thumb CMC Joint Arthrodesis      .                                        Pre Operative Physician Prophylaxis Orders - SCIP Protocols      Pre-Operative Antibiotic Order:    No Known Allergies       []  ----  No Antibiotic Ordered       [x]  ----  Give the following Antibiotic within 1 hour prior to start time:         Ancef 1 gram IV if patient is less than 200 pounds    or       Ancef 2 grams IV if patient is greater than 200 pounds    or      Vancomycin 1 gram IV (over 1 hour) if patient is allergic to           PENICILLINS or CEFALOSPORINS            Procedure:  right Thumb [facility] Joint Arthrodesis     Patient: Adwoa Edwards  :    2000    Physician Signature:     Date: 22  Time: 1:48 PM
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
## 2022-05-11 NOTE — LETTER
CONSENT TO OPERATION  AND/OR OTHER PROCEDURE(S)          PATIENT : Saba Edwards   YOB: 2000      DATE : 5/11/22          1. I request and consent that Dr. Rach Venegas. Pola Vega,  and/or his associates or assistants perform an operation and/or procedures on the above patient at  [place] [address], to treat the condition(s) which appear indicated by the diagnostic studies already performed. I have been told that in general terms the nature, purpose and reasonable expectations of the operation and/or procedure(s) are:      Right Thumb [facility] Joint Arthrodesis      2. It has been explained to me by the informing physician that during the course of the operation and/or procedure(s) unforeseen conditions may be revealed that necessitate an extension of the original operation and/or procedure(s) or different operation and/or procedures than those set forth in Paragraph 1. I therefore authorize and request that my physician and/or his associates or assistants perform such operations and/or procedures as are necessary and desirable in the exercise of professional judgment. The authority granted under this Paragraph 2 shall extend to all conditions that require treatment and are known to my physician at the time the operation is commenced. 3. I have been made aware by the informing physician of certain risks and consequences that are associated with the operation and/or procedure(s) described in Paragraph 1, the reasonable alternative methods or treatment, the possible consequences, the possibility that the operation and/or procedure(s) may be unsuccessful and the possibility of complications.   I understand the reasonably known risks to be:      - Bleeding  - Infection  - Poor Healing  - Possible Damage to Nerve, Vessel, Tendon/Muscle or Bone  - Need for further Treatment/Surgery  - Stiffness  - Pain  - Residual or Recurrent Symptoms  - Anesthetic and/or Medical Risks  - We have discussed the specific limitations and risks of hospital and/or office based treatment at this time due to the COVID-19 pandemic                I have been counseled about the risks of bonnie Covid-19 in the janie-operative and post-operative periods related to this procedure. I have been made aware that bonnie Covid-19 around the time of a surgical procedure may worsen my prognosis for recovering from the virus and lend to a higher morbidity and or mortality risk. With this knowledge, I have requested to proceed with the procedure as scheduled. 4. I have also been informed by the informing physician that there are other risks from both known and unknown causes that are attendant to the performance of any surgical procedure. I am aware that the practice of medicine and surgery is not an exact science, and that no guarantees have been made to me concerning the results of the operation and/or procedure(s). 5. I   CONSENT / REFUSE CONSENT  (strike the phrase that does not apply) to the taking of photographs before, during and/or after the operation or procedure for scientific/educational purposes. 6. I consent to the administration of anesthesia and to the use of such anesthetics as may be deemed advisable by the anesthesiologist who has been engaged by me or my physician.     7. I certify that I have read and understand the above consent to operation and/or other procedure(s); that the explanations therein referred to were made to me by the informing physician in advance of my signing this consent; that all blanks or statements requiring insertion or completion were filled in and inapplicable paragraphs, if any, were stricken before I signed; and that all questions asked by me about the operation and/or procedure(s) which I have consented to have been fully answered in a satisfactory manner.                                 _______________________           5/11/22 Witness     Signature Of Patient         Date        Katerina Adamson                                                 Informing Physician                                           Signature of Informing Physician                              If patient is unable to sign or is a minor, complete one of the following:    (A)  Patient is a minor   years of age. (B)  Patient is unable to sign because: The undersigned represents that he or she is duly authorized to execute this consent for and on behalf of the above named patient. Witness               o  Parent  o  Guardian   o  Spouse       o  Other (specify)                                           Patient Name: Ash Edwards  Patient YOB: 2000  Dr. Efraín Navarro' Return To Work Policy  Regarding your ability to return to work after surgery or injury, Dr. Efraín Navarro will not state that any patient is off of work or cannot work at all. He will place you on restrictions after your surgical procedure or injury. Depending on the details of your particular situation, Dr. Efraín Navarro may state that you will have either light use or no use of your hand for a specific number of weeks. It is your obligation to communicate with your employer regarding your restrictions. It is your employer's decision as to whether they will accommodate your restrictions (i.e. allow you to come to work in your restricted capacity) or to not allow you to return to work under your restrictions. Dr. Efraín Navarro does not participate in making this decision and cannot influence your employer regarding their decision. If you do not communicate your restrictions to your employer, or if you do not present to work as you are scheduled to, Dr. Efraín Navarro will not provide an 'excuse' to explain your absence. A doctors note, or official forms (BWC, FMLA, etc.) will be filled out, upon request, to indicate your date of surgery and your restrictions as stated above.    [facility]' Narcotic Policy  Patients will only be prescribed narcotics after surgical procedures or significant injury. Not all procedures cause pain great enough to require Narcotics and thus, not all patients will receive prescriptions after surgical procedures or injuries. Narcotics are never prescribed for chronic conditions. Narcotics are never prescribed for use longer than one week at a time. Refills are only granted in unusual circumstances and only at Dr. Nina Brittle discretion. Patients who are receiving narcotic medication from another physician or who are under pain management contracts will not be given a prescription for narcotics for any reason. Surgery Arrival Time:  You have been advised of the START TIME for your surgery as well as the ARRIVAL TIME at which you need to arrive at the surgery facility. Please understand that there is a certain amount of preparation which takes place at the surgery facility prior to the start of your surgery. If you arrive later than your scheduled ARRIVAL TIME, it may be necessary to cancel your surgery for that day and reschedule your procedure due to lack of adequate time for pre-surgery preparations. Thank you for being on time for your arrival.    I have read the above policies and understand that by agreeing to proceed with treatment by Dr. Madina Friend and his team, that I am agreeing to abide by these policies.   Patient Name:  Shahida Edwards    Patient Signature:  _____________________________    Malik Powell Date:   5/11/22

## 2022-05-11 NOTE — Clinical Note
Dear  Jesus Pastrana, APRN - CNP,    Thank you very much for your referral or Ms. Dejesus Leader to me for evaluation and treatment of her Hand & Wrist condition. I appreciate your confidence in me and thank you for allowing me the opportunity to care for your patients. If I can be of any further assistance to you on this or any other patient, please do not hesitate to contact me. Sincerely,    Carlos Velazquez.  Jarad Carolina MD

## 2022-05-12 ENCOUNTER — HOSPITAL ENCOUNTER (OUTPATIENT)
Dept: OCCUPATIONAL THERAPY | Age: 22
Setting detail: THERAPIES SERIES
Discharge: HOME | End: 2022-05-12

## 2022-05-12 PROCEDURE — 97110 THERAPEUTIC EXERCISES: CPT

## 2022-05-12 PROCEDURE — 97165 OT EVAL LOW COMPLEX 30 MIN: CPT

## 2022-05-12 PROCEDURE — 97140 MANUAL THERAPY 1/> REGIONS: CPT

## 2022-05-12 NOTE — FLOWSHEET NOTE
[facility] Occupational Therapy  [address] [place] [address], [address]  Phone: [phone]   Fax: [phone]      Occupational Therapy Daily Treatment Note  Date:  2022    Patient: Jacobo Patten   : 2000  MRN: [id number]  Referring Physician: Dr. Emelia Tee MD         Medical Diagnosis Information: R wrist pain (M25.531), chronic R wrist and finger flexion contracture     Date of Injury: ~11 yrs ago  Date of Surgery: n/a                                      Insurance information: self-pay, applying for financial assistance      Progress Report: []  Yes  [x]  No     Date Range for reporting period:  Beginnin22  Ending: end of POC    Progress report due (10 Rx/or 30 days whichever is less): visit #10 or     Recertification due (POC duration/ or 90 days whichever is less): visit #12 or 22    Visit # Insurance Allowable Auth required? Date Range    Self pay []  Yes  [x]  No n/a       Latex Allergy:  [x]No      []Yes  Pacemaker:  [x] No       [] Yes     Preferred Language for Healthcare:   []English       [x]other: Uzbek    Pain level:  8/10, R hand and wrist    SUBJECTIVE:  See eval    Functional Disability Index:  FOTO:  Patient's Physical FS Primary Measure- 63  Risk Adjusted Statistical FOTO- 58     Quick DASH: total score- 26, disability score- 34.09%    OBJECTIVE: See eval      RESTRICTIONS/PRECAUTIONS: none noted on order    Exercises/Interventions: Session initiated with assessment of pt status using subjective and objective measures noted in evaluation, followed by collaborative discussion regarding goals. Pt received STM of dorsal and volar forearm with pt reporting decreased tightness following and better tolerating PROM of wrist and finger extension. Pt educated in HEP below, demonstrating correct form and verbalizing understanding of completion.        Therapeutic Exercises  Resistance / level Sets/sec Reps Notes Neuromuscular Re-ed / Therapeutic Activities                                                 Manual Intervention                                                     Modalities:     Patient education:  Eval, POC, HEP- pt verbalized understanding        Home Exercise Program:  Written and verbal HEP instructions provided and reviewed:  · Wrist extension stretch  · Finger extension stretch  · Continued functional use  · Plan to incorporate night splinting      Therapeutic Exercise and NMR:  [x] (06185) Provided verbal/tactile cueing for activities related to strengthening, flexibility, endurance, ROM  for improvements in scapular, scapulothoracic and UE control with self care, reaching, carrying, lifting, house/yardwork, driving/computer work.    [] (33231) Provided verbal/tactile cueing for activities related to improving balance, coordination, kinesthetic sense, posture, motor skill, proprioception  to assist with  scapular, scapulothoracic and UE control with self care, reaching, carrying, lifting, house/yardwork, driving/computer work.   [] Comments:    Therapeutic Activities:    [] (55983 or 42329) Provided verbal/tactile cueing for activities related to improving balance, coordination, kinesthetic sense, posture, motor skill, proprioception and motor activation to allow for proper function of scapular, scapulothoracic and UE control with self care, carrying, lifting, driving/computer work  [] Comments:    Home Exercise Program:    [x] (99093) Reviewed/Progressed HEP activities related to strengthening, flexibility, endurance, ROM of scapular, scapulothoracic and UE control with self care, reaching, carrying, lifting, house/yardwork, driving/computer work  [] (19164) Reviewed/Progressed HEP activities related to improving balance, coordination, kinesthetic sense, posture, motor skill, proprioception of scapular, scapulothoracic and UE control with self care, reaching, carrying, lifting, house/yardwork, driving/computer work    [] Comments:    Manual Treatments:  PROM / STM / Oscillations-Mobs:  G-I, II, III, IV (PA's, Inf., Post.)  [x] (94094) Provided manual therapy to mobilize soft tissue/joints of cervical/CT, scapular GHJ and UE for the purpose of modulating pain, promoting relaxation,  increasing ROM, reducing/eliminating soft tissue swelling/inflammation/restriction, improving soft tissue extensibility and allowing for proper ROM for normal function with self care, reaching, carrying, lifting, house/yardwork, driving/computer work  [] Comments:    ADL Training:  [] (12226) Provided self-care/home management training related to activities of daily living and compensatory training, and/or use of adaptive equipment   [] Comments:     Splinting:  [] Fabrication of:   [] (13266) Orthotic/Prosthetic Management, subsequent encounter  [] (55043) Orthotic management and training (fitting and assessment)  [] Comments:      Charges:  Timed Code Treatment Minutes: 30   Total Treatment Minutes: 45     [x] EVAL (LOW) 72328   [] OT Re-eval (62114)  [] EVAL (MOD) 90235   [] EVAL (HIGH) 46116       [x] Ayesha (16190) x    1 [] QBVAU(03171)  [] NMR (34940) x     [] Estim (attended) (34509)   [x] Manual ([id number]) x    1 [] US (33193)  [] TA (35929) x     [] Paraffin (97434)  [] ADL  ([id number]) x    [] Splint/L code:    [] Estim (unattended) ([id number])  [] Fluidotherapy (23958)  [] Other:    GOALS:     Patient stated goal: decrease pain  []? ? Progressing: []?? Met: []?? Not Met: []?? Adjusted     Therapist goals for Patient:   Short Term Goals: To be achieved in: 30 days  1. Pt will decrease compensatory movement patterns to extend fingers from full fist while maintaining neutral wrist for improved functional use by 6/12/22.    []?? Progressing: []?? Met: []?? Not Met: []?? Adjusted  2. Pt will improve R hand/wrist ROM and strength to lift 40# bimanually to increase independence in IADLs and childrearing responsibilities by 6/12/22. []?? Progressing: []?? Met: []?? Not Met: []?? Adjusted  3. Pt will decrease pain to report average weekly pain level of 4/10 or less to improve quality of life by 6/12/22. []?? Progressing: []?? Met: []?? Not Met: []?? Adjusted     Long Term Goals: To be achieved by discharge  1. FOTO score of at least 74 to assist with reaching prior level of function. []? Progressing: []? Met: []? Not Met: []? Adjusted  2. Pt will report a QuickDASH Symptom Severity Scale score of 25% or less indicating increased safety and functional independence in desired occupational pursuits by discharge.  []?? Progressing: []?? Met: []?? Not Met: []?? Adjusted    Progression Towards Functional goals:  [] Patient is progressing as expected towards functional goals listed. [] Progression is slowed due to complexities listed. [] Progression has been slowed due to co-morbidities. [x] Plan just implemented, too soon to assess goals progression  [] All goals are met  [] Other:     ASSESSMENT:  See eval    Treatment/Activity Tolerance:  [x] Patient tolerated treatment well [] Patient limited by fatique  [] Patient limited by pain  [] Patient limited by other medical complications  [] Other:     Prognosis: [x] Good [] Fair  [] Poor    Patient Requires Follow-up: [x] Yes  [] No    PLAN: See eval  [] Continue per plan of care [] Alter current plan (see comments)  [x] Plan of care initiated [] Hold pending MD visit [] Discharge    Electronically signed by: RAMBO Thompson/SALONI, C/NDT ([id number])      Note: If patient does not return for scheduled/ recommended follow up visits, this note will serve as a discharge from care along with most recent update on progress.
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
## 2022-05-20 ENCOUNTER — HOSPITAL ENCOUNTER (OUTPATIENT)
Dept: OCCUPATIONAL THERAPY | Age: 22
Setting detail: THERAPIES SERIES
Discharge: HOME | End: 2022-05-20

## 2022-05-20 PROCEDURE — 97140 MANUAL THERAPY 1/> REGIONS: CPT

## 2022-05-20 PROCEDURE — 97110 THERAPEUTIC EXERCISES: CPT

## 2022-05-20 PROCEDURE — 97022 WHIRLPOOL THERAPY: CPT

## 2022-05-20 NOTE — FLOWSHEET NOTE
[facility] Occupational Therapy  [address] [place] [address]  Phone: [phone]   Fax: [phone]      Occupational Therapy Daily Treatment Note  Date:  2022    Patient: Ebenezer Chavez   : 2000  MRN: [id number]  Referring Physician: Dr. 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 Date Range    Self pay []  Yes  [x]  No n/a       Latex Allergy:  [x]No      []Yes  Pacemaker:  [x] No       [] Yes     Preferred Language for Healthcare:   []English       [x]other: Cuban    Pain level:  Improved to 1/10 at rest on this date, R hand and wrist    SUBJECTIVE:  Pt reports pain has significantly reduced since last session; states massage and stretching seem to have helped. Reports compliance with HEP. Functional Disability Index:  FOTO:  Patient's Physical FS Primary Measure- 63  Risk Adjusted Statistical FOTO- 58     Quick DASH: total score- 26, disability score- 34.09%    OBJECTIVE: See eval      RESTRICTIONS/PRECAUTIONS: none noted on order    Exercises/Interventions: Treatment focused on increasing pain-free ROM and voluntary control of R hand and wrist. Session initiated with 8 min AROM during fluidotherapy for soft tissue benefits while therapist prepared splinting materials. STM of dorsal and volar forearm with pt reporting decreased tightness following.  KIN and manual stretching for wrist and finger extension to prepare hand position for splinting. Attempted forearm-based resting hand splint with pt not tolerating well at this time due to \"too much stretch\" with wrist and fingers simultaneously. Fabricated hand-based splint to increase finger extension while allowing for active wrist movement as needed with pt reporting improved comfort and tolerating well. Will add wrist extension as tolerated. Pt educated on purpose, donning/doffing, and use at night as pt uses hand functionally during the day; pt verbalized understanding. Therapeutic Exercises  Resistance / level Sets/sec Reps Notes                                                                                Neuromuscular Re-ed / Therapeutic Activities                                                 Manual Intervention                                                     Modalities: 8 min fluido    Patient education:  Eval, POC, HEP- pt verbalized understanding        Home Exercise Program:  Written and verbal HEP instructions provided and reviewed:  · Wrist extension stretch  · Finger extension stretch  · Continued functional use  · Plan to incorporate night splinting      Therapeutic Exercise and NMR:  [x] (10680) Provided verbal/tactile cueing for activities related to strengthening, flexibility, endurance, ROM  for improvements in scapular, scapulothoracic and UE control with self care, reaching, carrying, lifting, house/yardwork, driving/computer work.    [] (45095) Provided verbal/tactile cueing for activities related to improving balance, coordination, kinesthetic sense, posture, motor skill, proprioception  to assist with  scapular, scapulothoracic and UE control with self care, reaching, carrying, lifting, house/yardwork, driving/computer work.   [] Comments:    Therapeutic Activities:    [] (25135 or 41438) Provided verbal/tactile cueing for activities related to improving balance, coordination, kinesthetic sense, posture, motor skill, proprioception and motor activation to allow for proper function of scapular, scapulothoracic and UE control with self care, carrying, lifting, driving/computer work  [] Comments:    Home Exercise Program:    [x] (64800) Reviewed/Progressed HEP activities related to strengthening, flexibility, endurance, ROM of scapular, scapulothoracic and UE control with self care, reaching, carrying, lifting, house/yardwork, driving/computer work  [] (52278) Reviewed/Progressed HEP activities related to improving balance, coordination, kinesthetic sense, posture, motor skill, proprioception of scapular, scapulothoracic and UE control with self care, reaching, carrying, lifting, house/yardwork, driving/computer work    [] Comments:    Manual Treatments:  PROM / STM / Oscillations-Mobs:  G-I, II, III, IV (PA's, Inf., Post.)  [x] (41038) Provided manual therapy to mobilize soft tissue/joints of cervical/CT, scapular GHJ and UE for the purpose of modulating pain, promoting relaxation,  increasing ROM, reducing/eliminating soft tissue swelling/inflammation/restriction, improving soft tissue extensibility and allowing for proper ROM for normal function with self care, reaching, carrying, lifting, house/yardwork, driving/computer work  [] Comments:    ADL Training:  [] (57309) Provided self-care/home management training related to activities of daily living and compensatory training, and/or use of adaptive equipment   [] Comments:     Splinting:  [] Fabrication of:   [] (24566) Orthotic/Prosthetic Management, subsequent encounter  [] (45740) Orthotic management and training (fitting and assessment)  [] Comments:      Charges:  Timed Code Treatment Minutes: 42   Total Treatment Minutes: 50     [] EVAL (LOW) 46111   [] OT Re-eval (67762)  [] EVAL (MOD) 10969   [] EVAL (HIGH) 85938       [x] Ayesha (33553) x    1 [] [facility](08898)  [] NMR (07185) x     [] Estim (attended) (75232)   [x] Manual ([id number]) x 1 [] US (81728)  [] TA (05185) x     [] Paraffin (28312)  [] ADL  (20269) x    [] Splint/L code:    [] Estim (unattended) (22 [id number])  [x] Fluidotherapy (54129)  [] Other:    GOALS:     Patient stated goal: decrease pain  []? ? Progressing: []?? Met: []?? Not Met: []?? Adjusted     Therapist goals for Patient:   Short Term Goals: To be achieved in: 30 days  1. Pt will decrease compensatory movement patterns to extend fingers from full fist while maintaining neutral wrist for improved functional use by 6/12/22. []?? Progressing: []?? Met: []?? Not Met: []?? Adjusted  2. Pt will improve R hand/wrist ROM and strength to lift 40# bimanually to increase independence in IADLs and childrearing responsibilities by 6/12/22. []?? Progressing: []?? Met: []?? Not Met: []?? Adjusted  3. Pt will decrease pain to report average weekly pain level of 4/10 or less to improve quality of life by 6/12/22. []?? Progressing: []?? Met: []?? Not Met: []?? Adjusted     Long Term Goals: To be achieved by discharge  1. FOTO score of at least 74 to assist with reaching prior level of function. []? Progressing: []? Met: []? Not Met: []? Adjusted  2. Pt will report a QuickDASH Symptom Severity Scale score of 25% or less indicating increased safety and functional independence in desired occupational pursuits by discharge.  []?? Progressing: []?? Met: []?? Not Met: []?? Adjusted    Progression Towards Functional goals:  [] Patient is progressing as expected towards functional goals listed. [] Progression is slowed due to complexities listed. [] Progression has been slowed due to co-morbidities.   [x] Plan just implemented, too soon to assess goals progression  [] All goals are met  [] Other:     ASSESSMENT:  Improved tolerance of wrist and finger extension on this date, especially following heat and STM    Treatment/Activity Tolerance:  [x] Patient tolerated treatment well [] Patient limited by fatique  [] Patient limited by pain  [] Patient limited by other medical complications  [] Other:     Prognosis: [x] Good [] Fair  [] Poor    Patient Requires Follow-up: [x] Yes  [] No    PLAN: See eval  [x] Continue per plan of care [] Alter current plan (see comments)  [] Plan of care initiated (MD cosigned) [] Hold pending MD visit [] Discharge    Electronically signed by: RAMOB Quiñones/NOEMI GUALLPA/LALI ([id number])      Note: If patient does not return for scheduled/ recommended follow up visits, this note will serve as a discharge from care along with most recent update on progress.
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
## 2022-05-25 ENCOUNTER — HOSPITAL ENCOUNTER (OUTPATIENT)
Dept: OCCUPATIONAL THERAPY | Age: 22
Setting detail: THERAPIES SERIES
Discharge: HOME | End: 2022-05-25

## 2022-05-25 PROCEDURE — 97760 ORTHOTIC MGMT&TRAING 1ST ENC: CPT

## 2022-05-25 PROCEDURE — 97140 MANUAL THERAPY 1/> REGIONS: CPT

## 2022-05-25 PROCEDURE — 97018 PARAFFIN BATH THERAPY: CPT

## 2022-05-25 NOTE — FLOWSHEET NOTE
forearm-based finger extension splint with decreased finger extension and neutral/slightly extended wrist with improved tolerance. Once pt in splint, added foam block outside of splint for further finger extension stretch with pt continuing to tolerate well. Pt educated on additional methods to increase extension with pt verbalizing understanding. Worked on decreasing tenodesis grasp for better isolation of wrist/finger movements. Therapeutic Exercises  Resistance / level Sets/sec Reps Notes                                                                                Neuromuscular Re-ed / Therapeutic Activities                                                 Manual Intervention                                                     Modalities: , 8 min paraffin     Patient education:  Eval, POC, HEP- pt verbalized understanding        Home Exercise Program:  Written and verbal HEP instructions provided and reviewed:  · Wrist extension stretch  · Finger extension stretch  · Continued functional use  · Forearm-based finger extension splint at night      Therapeutic Exercise and NMR:  [x] (60361) Provided verbal/tactile cueing for activities related to strengthening, flexibility, endurance, ROM  for improvements in scapular, scapulothoracic and UE control with self care, reaching, carrying, lifting, house/yardwork, driving/computer work.    [] (68658) Provided verbal/tactile cueing for activities related to improving balance, coordination, kinesthetic sense, posture, motor skill, proprioception  to assist with  scapular, scapulothoracic and UE control with self care, reaching, carrying, lifting, house/yardwork, driving/computer work.   [] Comments:    Therapeutic Activities:    [] (60374 or 56920) Provided verbal/tactile cueing for activities related to improving balance, coordination, kinesthetic sense, posture, motor skill, proprioception and motor activation to allow for proper function of scapular, scapulothoracic and UE control with self care, carrying, lifting, driving/computer work  [] Comments:    Home Exercise Program:    [x] (60969) Reviewed/Progressed HEP activities related to strengthening, flexibility, endurance, ROM of scapular, scapulothoracic and UE control with self care, reaching, carrying, lifting, house/yardwork, driving/computer work  [] (38443) Reviewed/Progressed HEP activities related to improving balance, coordination, kinesthetic sense, posture, motor skill, proprioception of scapular, scapulothoracic and UE control with self care, reaching, carrying, lifting, house/yardwork, driving/computer work    [] Comments:    Manual Treatments:  PROM / STM / Oscillations-Mobs:  G-I, II, III, IV (PA's, Inf., Post.)  [x] (72465) Provided manual therapy to mobilize soft tissue/joints of cervical/CT, scapular GHJ and UE for the purpose of modulating pain, promoting relaxation,  increasing ROM, reducing/eliminating soft tissue swelling/inflammation/restriction, improving soft tissue extensibility and allowing for proper ROM for normal function with self care, reaching, carrying, lifting, house/yardwork, driving/computer work  [] Comments:    ADL Training:  [] (05080) Provided self-care/home management training related to activities of daily living and compensatory training, and/or use of adaptive equipment   [] Comments:     Splinting:  [] Fabrication of:   [] (60285) Orthotic/Prosthetic Management, subsequent encounter  [] (62504) Orthotic management and training (fitting and assessment)  [] Comments:      Charges:  Timed Code Treatment Minutes: 40   Total Treatment Minutes: 48     [] EVAL (LOW) 43570   [] OT Re-eval (35496)  [] EVAL (MOD) 65445   [] EVAL (HIGH) 51356       [] Ayesha (69614) x     [] WFTON(33928)  [] NMR (09737) x     [] Estim (attended) (20301)   [x] Manual ([id number]) x    1 [] US (74859)  [] TA (30701) x     [x] Paraffin (17718)  [] ADL  (09 649 24 60) x    [x] Splint/L code:    [] Estim (unattended) (22 021294)  [] Fluidotherapy (71249)  [] Other:    GOALS:     Patient stated goal: decrease pain  []? ? Progressing: []?? Met: []?? Not Met: []?? Adjusted     Therapist goals for Patient:   Short Term Goals: To be achieved in: 30 days  1. Pt will decrease compensatory movement patterns to extend fingers from full fist while maintaining neutral wrist for improved functional use by 6/12/22. []?? Progressing: []?? Met: []?? Not Met: []?? Adjusted  2. Pt will improve R hand/wrist ROM and strength to lift 40# bimanually to increase independence in IADLs and childrearing responsibilities by 6/12/22. []?? Progressing: []?? Met: []?? Not Met: []?? Adjusted  3. Pt will decrease pain to report average weekly pain level of 4/10 or less to improve quality of life by 6/12/22. []?? Progressing: []?? Met: []?? Not Met: []?? Adjusted     Long Term Goals: To be achieved by discharge  1. FOTO score of at least 74 to assist with reaching prior level of function. []? Progressing: []? Met: []? Not Met: []? Adjusted  2. Pt will report a QuickDASH Symptom Severity Scale score of 25% or less indicating increased safety and functional independence in desired occupational pursuits by discharge.  []?? Progressing: []?? Met: []?? Not Met: []?? Adjusted    Progression Towards Functional goals:  [] Patient is progressing as expected towards functional goals listed. [] Progression is slowed due to complexities listed. [] Progression has been slowed due to co-morbidities. [x] Plan just implemented, too soon to assess goals progression  [] All goals are met  [] Other:     ASSESSMENT:  Improved tolerance of wrist and finger extension on this date, especially following heat and STM. Forearm-based splint improved tolerance.      Treatment/Activity Tolerance:  [x] Patient tolerated treatment well [] Patient limited by fatique  [] Patient limited by pain  [] Patient limited by other medical complications  [] Other:     Prognosis: [x] Good [] Fair  [] Poor    Patient Requires Follow-up: [x] Yes  [] No    PLAN: See eval  [x] Continue per plan of care [] Alter current plan (see comments)  [] Plan of care initiated (MD cosigned) [] Hold pending MD visit [] Discharge    Electronically signed by: RAMBO Ge/NOEMI GUALLPA/LALI ([id number])      Note: If patient does not return for scheduled/ recommended follow up visits, this note will serve as a discharge from care along with most recent update on progress.

## 2022-05-27 ENCOUNTER — APPOINTMENT (OUTPATIENT)
Dept: OCCUPATIONAL THERAPY | Age: 22
End: 2022-05-27

## 2022-06-01 ENCOUNTER — HOSPITAL ENCOUNTER (OUTPATIENT)
Dept: OCCUPATIONAL THERAPY | Age: 22
Setting detail: THERAPIES SERIES
Discharge: HOME | End: 2022-06-01

## 2022-06-01 PROCEDURE — 97110 THERAPEUTIC EXERCISES: CPT

## 2022-06-01 PROCEDURE — 97032 APPL MODALITY 1+ESTIM EA 15: CPT

## 2022-06-01 PROCEDURE — 97022 WHIRLPOOL THERAPY: CPT

## 2022-06-01 NOTE — FLOWSHEET NOTE
[address] [facility] Occupational Therapy  [address] [place] [address]  Phone: [phone]   Fax: [phone]      Occupational Therapy Daily Treatment Note  Date:  2022    Patient: Ronnie Mcneill   : 2000  MRN: [id number]  Referring Physician: Dr. 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 Date Range    Self pay []  Yes  [x]  No n/a       Latex Allergy:  [x]No      []Yes  Pacemaker:  [x] No       [] Yes     Preferred Language for Healthcare:   []English       [x]other: Surinamese    Pain level:  Improved to 1/10 at rest on this date, R hand and wrist    SUBJECTIVE:  Pt reports ability to now fully straighten R index finger (resembles pointing finger position), as well as improved grasp/release. States decreased pain in forearm with some continued tightness in upper arm. Functional Disability Index:  FOTO:  Patient's Physical FS Primary Measure- 63  Risk Adjusted Statistical FOTO- 58     Quick DASH: total score- 26, disability score- 34.09%    OBJECTIVE: See eval      RESTRICTIONS/PRECAUTIONS: none noted on order    Exercises/Interventions: Treatment focused on increasing pain-free ROM and voluntary control of R hand and wrist. Session initiated with 8 min AROM during fluidotherapy for soft tissue benefits, followed by AAROM and manual stretching for wrist and finger extension. [place] e-stim for wrist and finger extension with guided AAROM and progressive stretch using foam blocks; with wrist in neutral, achieving either full IP or MCP extension at this time, but not both simultaneously. Pt then completing chest press and mid row with scap retraction with pt reporting \"pulling\" feeling in R upper arm. Pt to complete at home with broom to train RUE movement patterns with LUE. Therapeutic Exercises  Resistance / level Sets/sec Reps Notes                                                                                Neuromuscular Re-ed / Therapeutic Activities                                                 Manual Intervention                                                     Modalities: 8 min fluido,  Patient education:  Eval, POC, HEP- pt verbalized understanding        Home Exercise Program:  Written and verbal HEP instructions provided and reviewed:  · Wrist extension stretch  · Finger extension stretch  · Continued functional use  · Forearm-based finger extension splint at night   · 6/1/22: bimanual chest press, mid row, horizontal ab/adduction, and shoulder flexion/extension grasping broom      Therapeutic Exercise and NMR:  [x] (29952) Provided verbal/tactile cueing for activities related to strengthening, flexibility, endurance, ROM  for improvements in scapular, scapulothoracic and UE control with self care, reaching, carrying, lifting, house/yardwork, driving/computer work.    [] (03563) Provided verbal/tactile cueing for activities related to improving balance, coordination, kinesthetic sense, posture, motor skill, proprioception  to assist with  scapular, scapulothoracic and UE control with self care, reaching, carrying, lifting, house/yardwork, driving/computer work.   [] Comments:    Therapeutic Activities:    [] (26051 or 52738) Provided verbal/tactile cueing for activities related to improving balance, coordination, kinesthetic sense, posture, motor skill, proprioception and motor activation to allow for proper function of scapular, scapulothoracic and UE control with self care, carrying, lifting, driving/computer work  [] Comments:    Home Exercise Program:    [x] (60914) Reviewed/Progressed HEP activities related to strengthening, flexibility, endurance, ROM of scapular, scapulothoracic and UE control with self care, reaching, carrying, lifting, house/yardwork, driving/computer work  [] (55629) Reviewed/Progressed HEP activities related to improving balance, coordination, kinesthetic sense, posture, motor skill, proprioception of scapular, scapulothoracic and UE control with self care, reaching, carrying, lifting, house/yardwork, driving/computer work    [] Comments:    Manual Treatments:  PROM / STM / Oscillations-Mobs:  G-I, II, III, IV (PA's, Inf., Post.)  [x] (80706) Provided manual therapy to mobilize soft tissue/joints of cervical/CT, scapular GHJ and UE for the purpose of modulating pain, promoting relaxation,  increasing ROM, reducing/eliminating soft tissue swelling/inflammation/restriction, improving soft tissue extensibility and allowing for proper ROM for normal function with self care, reaching, carrying, lifting, house/yardwork, driving/computer work  [] Comments:    ADL Training:  [] (66338) Provided self-care/home management training related to activities of daily living and compensatory training, and/or use of adaptive equipment   [] Comments:     Splinting:  [] Fabrication of:   [] (49635) Orthotic/Prosthetic Management, subsequent encounter  [] (84522) Orthotic management and training (fitting and assessment)  [] Comments:      Charges:  Timed Code Treatment Minutes: 44   Total Treatment Minutes: 52     [] EVAL (LOW) 55644   [] OT Re-eval (47401)  [] EVAL (MOD) 59690   [] EVAL (HIGH) 20062       [x] Ayesha (31746) x   1  [] XSGCY(67727)  [] NMR (87339) x     [x] Estim (attended) (46516)   [] Manual ([id number]) x     [] US (19183)  [] TA (73082) x     [] Paraffin (70227)  [] ADL  (97 649 24 60) x    [] Splint/L code:    [] Estim (unattended) ([id number])  [x] Fluidotherapy (89026)  [] Other:    GOALS:     Patient stated goal: decrease pain  []? ? Progressing: []?? Met: []?? Not Met: []?? Adjusted     Therapist goals for Patient:   Short Term Goals: To be achieved in: 30 days  1. Pt will decrease compensatory movement patterns to extend fingers from full fist while maintaining neutral wrist for improved functional use by 6/12/22. []?? Progressing: []?? Met: []?? Not Met: []?? Adjusted  2. Pt will improve R hand/wrist ROM and strength to lift 40# bimanually to increase independence in IADLs and childrearing responsibilities by 6/12/22. []?? Progressing: []?? Met: []?? Not Met: []?? Adjusted  3. Pt will decrease pain to report average weekly pain level of 4/10 or less to improve quality of life by 6/12/22. []?? Progressing: []?? Met: []?? Not Met: []?? Adjusted     Long Term Goals: To be achieved by discharge  1. FOTO score of at least 74 to assist with reaching prior level of function. []? Progressing: []? Met: []? Not Met: []? Adjusted  2. Pt will report a QuickDASH Symptom Severity Scale score of 25% or less indicating increased safety and functional independence in desired occupational pursuits by discharge.  []?? Progressing: []?? Met: []?? Not Met: []?? Adjusted    Progression Towards Functional goals:  [x] Patient is progressing as expected towards functional goals listed. [] Progression is slowed due to complexities listed. [] Progression has been slowed due to co-morbidities.   [] Plan just implemented, too soon to assess goals progression  [] All goals are met  [] Other:     ASSESSMENT:  Improved wrist extension and ability to maintain neutral wrist during MCP or IP extension, but not both simultaneously    Treatment/Activity Tolerance:  [x] Patient tolerated treatment well [] Patient limited by fatique  [] Patient limited by pain  [] Patient limited by other medical complications  [] Other:     Prognosis: [x] Good [] Fair  [] Poor    Patient Requires Follow-up: [x] Yes  [] No    PLAN: See eval  [x] Continue per plan of care [] Alter current plan (see comments)  [] Plan of care initiated (MD cosigned) [] Hold pending MD visit [] Discharge    Electronically signed by: RAMBO Delgado/NOEMI GUALLPA/LALI ([id number])      Note: If patient does not return for scheduled/ recommended follow up visits, this note will serve as a discharge from care along with most recent update on progress.

## 2022-06-03 ENCOUNTER — HOSPITAL ENCOUNTER (OUTPATIENT)
Dept: OCCUPATIONAL THERAPY | Age: 22
Setting detail: THERAPIES SERIES
Discharge: HOME | End: 2022-06-03

## 2022-06-03 PROCEDURE — 97110 THERAPEUTIC EXERCISES: CPT

## 2022-06-03 PROCEDURE — 97112 NEUROMUSCULAR REEDUCATION: CPT

## 2022-06-03 NOTE — FLOWSHEET NOTE
[address] [facility] Occupational Therapy  [address] [place] [address], [address] [place] [address]  Phone: [phone]   Fax: [phone]      Occupational Therapy Daily Treatment Note  Date:  6/3/2022    Patient: Betty Wilson   : 2000  MRN: [id number]  Referring Physician: Dr. 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 States she is using her R hand a lot more, but this is resulting in increased soreness in forearm and upper arm. Pt also reports improved ulnar/radial deviation recently.      Functional Disability Index:  FOTO:  Patient's Physical FS Primary Measure- 63  Risk Adjusted Statistical FOTO- 58     Quick DASH: total score- 26, disability score- 34.09%    OBJECTIVE: See eval      RESTRICTIONS/PRECAUTIONS: none noted on order    Exercises/Interventions: Treatment focused on increasing pain-free ROM and voluntary control of R hand and wrist. Session initiated with 3.5 min UBE with emphasis on maintaining neutral wrist during grasp and pedaling; reports stretching work.  [] Comments:    Therapeutic Activities:    [] (69512 or 28507) Provided verbal/tactile cueing for activities related to improving balance, coordination, kinesthetic sense, posture, motor skill, proprioception and motor activation to allow for proper function of scapular, scapulothoracic and UE control with self care, carrying, lifting, driving/computer work  [] Comments:    Home Exercise Program:    [x] (55290) Reviewed/Progressed HEP activities related to strengthening, flexibility, endurance, ROM of scapular, scapulothoracic and UE control with self care, reaching, carrying, lifting, house/yardwork, driving/computer work  [] (62935) Reviewed/Progressed HEP activities related to improving balance, coordination, kinesthetic sense, posture, motor skill, proprioception of scapular, scapulothoracic and UE control with self care, reaching, carrying, lifting, house/yardwork, driving/computer work    [] Comments:    Manual Treatments:  PROM / STM / Oscillations-Mobs:  G-I, II, III, IV (PA's, Inf., Post.)  [x] (15388) Provided manual therapy to mobilize soft tissue/joints of cervical/CT, scapular GHJ and UE for the purpose of modulating pain, promoting relaxation,  increasing ROM, reducing/eliminating soft tissue swelling/inflammation/restriction, improving soft tissue extensibility and allowing for proper ROM for normal function with self care, reaching, carrying, lifting, house/yardwork, driving/computer work  [] Comments:    ADL Training:  [] (58021) Provided self-care/home management training related to activities of daily living and compensatory training, and/or use of adaptive equipment   [] Comments:     Splinting:  [] Fabrication of:   [] (14025) Orthotic/Prosthetic Management, subsequent encounter  [] (94775) Orthotic management and training (fitting and assessment)  [] Comments:      Charges:  Timed Code Treatment Minutes: 50   Total Treatment Minutes: 50     [] EVAL (LOW) 22 [id number]   [] OT Re-eval (64651)  [] EVAL (MOD) 73071   [] EVAL (HIGH) 93956       [x] Ayesha (60376) x   2  [] GSXXW(83993)  [x] NMR (60089) x 1    [] Estim (attended) (88175)   [] Manual ([id number]) x     [] US (03405)  [] TA (80490) x     [] Paraffin (39259)  [] ADL  (60230) x    [] Splint/L code:    [] Estim (unattended) ([id number])  [] Fluidotherapy (58885)  [] Other:    GOALS:     Patient stated goal: decrease pain  [x]? ? Progressing: []?? Met: []?? Not Met: []?? Adjusted     Therapist goals for Patient:   Short Term Goals: To be achieved in: 30 days  1. Pt will decrease compensatory movement patterns to extend fingers from full fist while maintaining neutral wrist for improved functional use by 6/12/22. [x]? ? Progressing: []?? Met: []?? Not Met: []?? Adjusted  2. Pt will improve R hand/wrist ROM and strength to lift 40# bimanually to increase independence in IADLs and childrearing responsibilities by 6/12/22. [x]? ? Progressing: []?? Met: []?? Not Met: []?? Adjusted  3. Pt will decrease pain to report average weekly pain level of 4/10 or less to improve quality of life by 6/12/22. [x]? ? Progressing: []?? Met: []?? Not Met: []?? Adjusted     Long Term Goals: To be achieved by discharge  1. FOTO score of at least 74 to assist with reaching prior level of function. [x]? Progressing: []? Met: []? Not Met: []? Adjusted  2. Pt will report a QuickDASH Symptom Severity Scale score of 25% or less indicating increased safety and functional independence in desired occupational pursuits by discharge. [x]? ? Progressing: []?? Met: []?? Not Met: []?? Adjusted    Progression Towards Functional goals:  [x] Patient is progressing as expected towards functional goals listed. [] Progression is slowed due to complexities listed. [] Progression has been slowed due to co-morbidities.   [] Plan just implemented, too soon to assess goals progression  [] All goals are met  [] Other:     ASSESSMENT:  Improved wrist extension and ability to maintain neutral wrist during MCP or IP extension, but not both simultaneously. Pt can fully extend MCPs and IPs simultaneously with wrist flexed. Treatment/Activity Tolerance:  [x] Patient tolerated treatment well [] Patient limited by fatique  [] Patient limited by pain  [] Patient limited by other medical complications  [] Other:     Prognosis: [x] Good [] Fair  [] Poor    Patient Requires Follow-up: [x] Yes  [] No    PLAN: See eval  [x] Continue per plan of care [] Alter current plan (see comments)  [] Plan of care initiated (MD cosigned) [] Hold pending MD visit [] Discharge    Electronically signed by: Sarah Banuelos, OTR/L, C/NDT ([id number])      Note: If patient does not return for scheduled/ recommended follow up visits, this note will serve as a discharge from care along with most recent update on progress.

## 2022-06-08 ENCOUNTER — HOSPITAL ENCOUNTER (OUTPATIENT)
Dept: OCCUPATIONAL THERAPY | Age: 22
Setting detail: THERAPIES SERIES
Discharge: HOME | End: 2022-06-08

## 2022-06-08 NOTE — FLOWSHEET NOTE
Occupational Therapy  Cancellation/No-show Note  Patient Name:  Liliana Edwards   :  2000   Date:  2022  Cancelled visits to date: 0  No-shows to date: 1    Patient status for today's appointment patient:  []  Cancelled  []  Rescheduled appointment  [x]  No-show - 22     Reason given by patient:  []  Patient ill  []  Conflicting appointment  []  No transportation    []  Conflict with work  []  No reason given  []  Other:     Comments:      Phone call information:   []  Phone call made today to patient at _ time at number provided:      []  Patient answered, conversation as follows:    []  Patient did not answer, message left as follows:  []  Phone call not made today   [x] Emailed pt at 11:30 am to check in and notify pt of missed appt and next appt tomorrow. Pt responded apologizing that she forgot about appt; states she will be in attendance tomorrow.        Electronically signed by:  RAMBO Li/SALONI, C/NDT ([id number])

## 2022-06-09 ENCOUNTER — HOSPITAL ENCOUNTER (OUTPATIENT)
Dept: OCCUPATIONAL THERAPY | Age: 22
Setting detail: THERAPIES SERIES
Discharge: HOME | End: 2022-06-09

## 2022-06-09 PROCEDURE — 97112 NEUROMUSCULAR REEDUCATION: CPT

## 2022-06-09 PROCEDURE — 97110 THERAPEUTIC EXERCISES: CPT

## 2022-06-09 PROCEDURE — 97032 APPL MODALITY 1+ESTIM EA 15: CPT

## 2022-06-09 NOTE — FLOWSHEET NOTE
[facility] Occupational Therapy  [address] [place] [address], [address]  Phone: [phone]   Fax: [phone]      Occupational Therapy Daily Treatment Note  Date:  2022    Patient: Paramjit Iraheta   : 2000  MRN: [id number]  Referring Physician: Dr. 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 Pt reports decreased upper arm discomfort after last session. Functional Disability Index:  FOTO:  Patient's Physical FS Primary Measure- 63  Risk Adjusted Statistical FOTO- 58     Quick DASH: total score- 26, disability score- 34.09%    OBJECTIVE: See eval      RESTRICTIONS/PRECAUTIONS: none noted on order    Exercises/Interventions: Treatment focused on increasing pain-free ROM and voluntary control of R hand and wrist. Session initiated with gentle STM and stretching due to intrinsic tightness, followed by joint mobilization for wrist, MCP, and IP extension.  Pt reporting decreased tightness following. Mirror therapy with [place] e-stim for coordinated wrist and finger flexion/extension during functional grasp patterns; electrodes applied to both extensors and flexors with reciprocal contraction. Noted improved ability to maintain neutral or slightly extended wrist during grasp/release following rather than adhering to tenodesis grasp pattern of wrist and fingers. Pt tolerated well. Therapeutic Exercises  Resistance / level Sets/sec Reps Notes                                                                                Neuromuscular Re-ed / Therapeutic Activities                                                 Manual Intervention                                                     Modalities:   Patient education:  Eval, POC, HEP- pt verbalized understanding        Home Exercise Program:  Written and verbal HEP instructions provided and reviewed:  · Wrist extension stretch  · Finger extension stretch  · Continued functional use  · Forearm-based finger extension splint at night   · 6/1/22: bimanual chest press, mid row, horizontal ab/adduction, and shoulder flexion/extension grasping broom      Therapeutic Exercise and NMR:  [x] (23955) Provided verbal/tactile cueing for activities related to strengthening, flexibility, endurance, ROM  for improvements in scapular, scapulothoracic and UE control with self care, reaching, carrying, lifting, house/yardwork, driving/computer work.    [] (51220) Provided verbal/tactile cueing for activities related to improving balance, coordination, kinesthetic sense, posture, motor skill, proprioception  to assist with  scapular, scapulothoracic and UE control with self care, reaching, carrying, lifting, house/yardwork, driving/computer work.   [] Comments:    Therapeutic Activities:    [] (38934 or 13707) Provided verbal/tactile cueing for activities related to improving balance, coordination, kinesthetic sense, posture, motor skill, proprioception and motor activation to allow for proper function of scapular, scapulothoracic and UE control with self care, carrying, lifting, driving/computer work  [] Comments:    Home Exercise Program:    [x] (99334) Reviewed/Progressed HEP activities related to strengthening, flexibility, endurance, ROM of scapular, scapulothoracic and UE control with self care, reaching, carrying, lifting, house/yardwork, driving/computer work  [] (54515) Reviewed/Progressed HEP activities related to improving balance, coordination, kinesthetic sense, posture, motor skill, proprioception of scapular, scapulothoracic and UE control with self care, reaching, carrying, lifting, house/yardwork, driving/computer work    [] Comments:    Manual Treatments:  PROM / STM / Oscillations-Mobs:  G-I, II, III, IV (PA's, Inf., Post.)  [x] (75103) Provided manual therapy to mobilize soft tissue/joints of cervical/CT, scapular GHJ and UE for the purpose of modulating pain, promoting relaxation,  increasing ROM, reducing/eliminating soft tissue swelling/inflammation/restriction, improving soft tissue extensibility and allowing for proper ROM for normal function with self care, reaching, carrying, lifting, house/yardwork, driving/computer work  [] Comments:    ADL Training:  [] (86670) Provided self-care/home management training related to activities of daily living and compensatory training, and/or use of adaptive equipment   [] Comments:     Splinting:  [] Fabrication of:   [] (75335) Orthotic/Prosthetic Management, subsequent encounter  [] (66331) Orthotic management and training (fitting and assessment)  [] Comments:      Charges:  Timed Code Treatment Minutes: 50   Total Treatment Minutes: 50     [] EVAL (LOW) 01498   [] OT Re-eval (93910)  [] EVAL (MOD) 86161   [] EVAL (HIGH) 18621       [x] Ayesha (42782) x   1  [] QGFTT(57790)  [x] NMR (77376) x 1    [x] Estim (attended) (08856)   [] Manual ([id number]) x     [] HS (43314)  [] TA (53916) x     [] Paraffin (99564)  [] Fair  [] Poor    Patient Requires Follow-up: [x] Yes  [] No    PLAN: See eval  [x] Continue per plan of care [] Alter current plan (see comments)  [] Plan of care initiated (MD cosigned) [] Hold pending MD visit [] Discharge    Electronically signed by: RAMBO Elizabeth/L, C/LALI ([id number])      Note: If patient does not return for scheduled/ recommended follow up visits, this note will serve as a discharge from care along with most recent update on progress.

## 2022-06-15 ENCOUNTER — HOSPITAL ENCOUNTER (OUTPATIENT)
Dept: OCCUPATIONAL THERAPY | Age: 22
Setting detail: THERAPIES SERIES
Discharge: HOME | End: 2022-06-15

## 2022-06-15 PROCEDURE — 97035 APP MDLTY 1+ULTRASOUND EA 15: CPT

## 2022-06-15 PROCEDURE — 97112 NEUROMUSCULAR REEDUCATION: CPT

## 2022-06-15 PROCEDURE — 97110 THERAPEUTIC EXERCISES: CPT

## 2022-06-15 NOTE — FLOWSHEET NOTE
[address] [facility] Occupational Therapy  [address] [place] [address]  Phone: [phone]   Fax: [phone]      Occupational Therapy Daily Treatment Note  Date:  6/15/2022    Patient: Angelika Bhatt   : 2000  MRN: [id number]  Referring Physician: Dr. 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 Date Range    Self pay []  Yes  [x]  No n/a       Latex Allergy:  [x]No      []Yes  Pacemaker:  [x] No       [] Yes     Preferred Language for Healthcare:   []English       [x]other: Thai    Pain level:  Improved to 1/10 at rest on this date, R hand and wrist    SUBJECTIVE:  Pt reports continued compliance with stretching and splint use with improved finger and wrist extension. Functional Disability Index:  FOTO:  Patient's Physical FS Primary Measure- 63  Risk Adjusted Statistical FOTO- 58     Quick DASH: total score- 26, disability score- 34.09%    OBJECTIVE: See eval      RESTRICTIONS/PRECAUTIONS: none noted on order    Exercises/Interventions: Treatment focused on increasing pain-free ROM and voluntary control of R hand and wrist. Session initiated with 8 min US to volar distal forearm at original injury site due to scar tissue build up potentially contributing to extrinsic tightness.  During US, completed progressive extrinsic and intrinsic stretch with pt grasping cone and therapist guiding into progressive wrist extension. IASTM of forearm using hawk , followed by STM of hand with gentle joint mobilization for metacarpal spread and finger extension. AAROM into progressive finger extension holding wrist in neutral with ability to attain MCP extension to neutral with wrist in neutral on this date without pain. Practiced grasp/release while maintaining wrist in neutral or slight extension for neuromuscular reeducation. Therapeutic Exercises  Resistance / level Sets/sec Reps Notes                                                                                Neuromuscular Re-ed / Therapeutic Activities                                                 Manual Intervention                                                     Modalities: , 8 min US (100%, 1.0)  Patient education:  Eval, POC, HEP- pt verbalized understanding        Home Exercise Program:  Written and verbal HEP instructions provided and reviewed:  · Wrist extension stretch  · Finger extension stretch  · Continued functional use  · Forearm-based finger extension splint at night   · 6/1/22: bimanual chest press, mid row, horizontal ab/adduction, and shoulder flexion/extension grasping broom      Therapeutic Exercise and NMR:  [x] (35465) Provided verbal/tactile cueing for activities related to strengthening, flexibility, endurance, ROM  for improvements in scapular, scapulothoracic and UE control with self care, reaching, carrying, lifting, house/yardwork, driving/computer work.    [] (90786) Provided verbal/tactile cueing for activities related to improving balance, coordination, kinesthetic sense, posture, motor skill, proprioception  to assist with  scapular, scapulothoracic and UE control with self care, reaching, carrying, lifting, house/yardwork, driving/computer work.   [] Comments:    Therapeutic Activities:    [] (47166 or 39875) Provided verbal/tactile cueing for activities related to improving balance, coordination, kinesthetic sense, posture, motor skill, proprioception and motor activation to allow for proper function of scapular, scapulothoracic and UE control with self care, carrying, lifting, driving/computer work  [] Comments:    Home Exercise Program:    [x] (63028) Reviewed/Progressed HEP activities related to strengthening, flexibility, endurance, ROM of scapular, scapulothoracic and UE control with self care, reaching, carrying, lifting, house/yardwork, driving/computer work  [] (82673) Reviewed/Progressed HEP activities related to improving balance, coordination, kinesthetic sense, posture, motor skill, proprioception of scapular, scapulothoracic and UE control with self care, reaching, carrying, lifting, house/yardwork, driving/computer work    [] Comments:    Manual Treatments:  PROM / STM / Oscillations-Mobs:  G-I, II, III, IV (PA's, Inf., Post.)  [x] (94798) Provided manual therapy to mobilize soft tissue/joints of cervical/CT, scapular GHJ and UE for the purpose of modulating pain, promoting relaxation,  increasing ROM, reducing/eliminating soft tissue swelling/inflammation/restriction, improving soft tissue extensibility and allowing for proper ROM for normal function with self care, reaching, carrying, lifting, house/yardwork, driving/computer work  [] Comments:    ADL Training:  [] (88197) Provided self-care/home management training related to activities of daily living and compensatory training, and/or use of adaptive equipment   [] Comments:     Splinting:  [] Fabrication of:   [] (26841) Orthotic/Prosthetic Management, subsequent encounter  [] (90027) Orthotic management and training (fitting and assessment)  [] Comments:      Charges:  Timed Code Treatment Minutes: 52   Total Treatment Minutes: 52     [] EVAL (LOW) 74687   [] OT Re-eval (69777)  [] EVAL (MOD) 94609   [] EVAL (HIGH) 16846       [x] Ayesha (90911) x   1  [] MDQVB(09248)  [x] NMR (33979) x 1    [] Estim (attended) (83699)   [] Manual (68866) x     [x] TW (96695)  [] TA (21993) x     [] Paraffin (73395)  [] ADL  (23124) x    [] Splint/L code:    [] Estim (unattended) (17473)  [] Fluidotherapy (73684)  [] Other:    GOALS:     Patient stated goal: decrease pain  [x]? ? Progressing: []?? Met: []?? Not Met: []?? Adjusted     Therapist goals for Patient:   Short Term Goals: To be achieved in: 30 days  1. Pt will decrease compensatory movement patterns to extend fingers from full fist while maintaining neutral wrist for improved functional use by 6/12/22. [x]? ? Progressing: []?? Met: []?? Not Met: []?? Adjusted  2. Pt will improve R hand/wrist ROM and strength to lift 40# bimanually to increase independence in IADLs and childrearing responsibilities by 6/12/22. [x]? ? Progressing: []?? Met: []?? Not Met: []?? Adjusted  3. Pt will decrease pain to report average weekly pain level of 4/10 or less to improve quality of life by 6/12/22. [x]? ? Progressing: []?? Met: []?? Not Met: []?? Adjusted     Long Term Goals: To be achieved by discharge  1. FOTO score of at least 74 to assist with reaching prior level of function. [x]? Progressing: []? Met: []? Not Met: []? Adjusted  2. Pt will report a QuickDASH Symptom Severity Scale score of 25% or less indicating increased safety and functional independence in desired occupational pursuits by discharge. [x]? ? Progressing: []?? Met: []?? Not Met: []?? Adjusted    Progression Towards Functional goals:  [x] Patient is progressing as expected towards functional goals listed. [] Progression is slowed due to complexities listed. [] Progression has been slowed due to co-morbidities.   [] Plan just implemented, too soon to assess goals progression  [] All goals are met  [] Other:     ASSESSMENT:  Pt able to extend MCPs to neutral with wrist in neutral on this date following US, IASTM, and stretching    Treatment/Activity Tolerance:  [x] Patient tolerated treatment well [] Patient limited by bobo  [] Patient limited by pain  [] Patient limited by other medical complications  [] Other:     Prognosis: [x] Good [] Fair  [] Poor    Patient Requires Follow-up: [x] Yes  [] No    PLAN: See eval  [x] Continue per plan of care [] Alter current plan (see comments)  [] Plan of care initiated (MD cosigned) [] Hold pending MD visit [] Discharge    Electronically signed by: RAMBO Dillard/L, C/NDT ([id number])      Note: If patient does not return for scheduled/ recommended follow up visits, this note will serve as a discharge from care along with most recent update on progress.

## 2022-06-17 ENCOUNTER — HOSPITAL ENCOUNTER (OUTPATIENT)
Dept: OCCUPATIONAL THERAPY | Age: 22
Setting detail: THERAPIES SERIES
Discharge: HOME | End: 2022-06-17

## 2022-06-17 PROCEDURE — 97140 MANUAL THERAPY 1/> REGIONS: CPT

## 2022-06-17 PROCEDURE — 97035 APP MDLTY 1+ULTRASOUND EA 15: CPT

## 2022-06-17 PROCEDURE — 97110 THERAPEUTIC EXERCISES: CPT

## 2022-06-17 NOTE — PROGRESS NOTES
[facility] Occupational Therapy  [address] [place], [place] [address], [address] [place] [address]  Phone: [phone]   Fax: [phone]      Occupational Therapy Daily Treatment Note  Date:  2022    Patient: Moreno Sen   : 2000  MRN: [id number]  Referring Physician: Dr. 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 Date Range   eval +  Self pay []  Yes  [x]  No n/a       Latex Allergy:  [x]No      []Yes  Pacemaker:  [x] No       [] Yes     Preferred Language for Healthcare:   []English       [x]other: Angolan    Pain level:  Improved to 1/10 at rest on this date, R hand and wrist    SUBJECTIVE:  Pt reports decreased pain and improved functional use. States work is going well and she has been able to use hand while cleaning and carrying.      Functional Disability Index:  FOTO:  Patient's Physical FS Primary Measure- 63  Risk Adjusted Statistical FOTO- 58     Quick DASH: total score- 26, disability score- 34.09%    22- emailed FOTO progress survey    OBJECTIVE:     ROM  22: with wrist in neutral, able to extend MCPs to neutral with full flexion of IPs and able to extend IPs to neutral with 90* flexion of MCPs     Hand Assessment Left  Right  Comments    9 Hole Peg Test (s) 30 42      Strength (lbs) 65 35  22: 42     Lateral Pinch Strength (lbs) 17 10     Tip Pinch Strength (lbs) 12 5   Opposition (Y/N) Y Y          RESTRICTIONS/PRECAUTIONS: none noted on order    Exercises/Interventions: Treatment focused on increasing pain-free ROM and voluntary control of R hand and wrist. Session initiated with assessment of pt progress with subjective and objective measures noted above, and progress with goals noted below. 8 min US to volar distal forearm at original injury site due to scar tissue build up potentially contributing to extrinsic tightness. During US, completed progressive extrinsic and intrinsic stretch with pt grasping cone and therapist guiding into progressive wrist extension. 6 min US to palmar hand for soft tissue benefits with pt tolerating increased manual stretch following. IASTM of forearm using hawk , followed gentle joint mobilization for metacarpal spread and finger extension. Practiced grasp/release while maintaining wrist in neutral or slight extension for neuromuscular reeducation.      Therapeutic Exercises  Resistance / level Sets/sec Reps Notes                                                                                Neuromuscular Re-ed / Therapeutic Activities                                                 Manual Intervention                                                     Modalities: , 14 min US (100%, 1.0)  Patient education:  Eval, POC, HEP- pt verbalized understanding        Home Exercise Program:  Written and verbal HEP instructions provided and reviewed:  · Wrist extension stretch  · Finger extension stretch  · Continued functional use  · Forearm-based finger extension splint at night   · 6/1/22: bimanual chest press, mid row, horizontal ab/adduction, and shoulder flexion/extension grasping broom      Therapeutic Exercise and NMR:  [x] (44577) Provided verbal/tactile cueing for activities related to strengthening, flexibility, endurance, ROM  for improvements in scapular, scapulothoracic and UE control with self care, reaching, carrying, lifting, house/yardwork, driving/computer work.    [] (24794) Provided verbal/tactile cueing for activities related to improving balance, coordination, kinesthetic sense, posture, motor skill, proprioception  to assist with  scapular, scapulothoracic and UE control with self care, reaching, carrying, lifting, house/yardwork, driving/computer work.   [] Comments:    Therapeutic Activities:    [] (34301 or 83325) Provided verbal/tactile cueing for activities related to improving balance, coordination, kinesthetic sense, posture, motor skill, proprioception and motor activation to allow for proper function of scapular, scapulothoracic and UE control with self care, carrying, lifting, driving/computer work  [] Comments:    Home Exercise Program:    [x] (45551) Reviewed/Progressed HEP activities related to strengthening, flexibility, endurance, ROM of scapular, scapulothoracic and UE control with self care, reaching, carrying, lifting, house/yardwork, driving/computer work  [] (67862) Reviewed/Progressed HEP activities related to improving balance, coordination, kinesthetic sense, posture, motor skill, proprioception of scapular, scapulothoracic and UE control with self care, reaching, carrying, lifting, house/yardwork, driving/computer work    [] Comments:    Manual Treatments:  PROM / STM / Oscillations-Mobs:  G-I, II, III, IV (PA's, Inf., Post.)  [x] (09429) Provided manual therapy to mobilize soft tissue/joints of cervical/CT, scapular GHJ and UE for the purpose of modulating pain, promoting relaxation,  increasing ROM, reducing/eliminating soft tissue swelling/inflammation/restriction, improving soft tissue extensibility and allowing for proper ROM for normal function with self care, reaching, carrying, lifting, house/yardwork, driving/computer work  [] Comments:    ADL Training:  [] (25905) Provided self-care/home management training related to activities of daily living and compensatory training, and/or use of adaptive as expected towards functional goals listed. [] Progression is slowed due to complexities listed. [] Progression has been slowed due to co-morbidities. [] Plan just implemented, too soon to assess goals progression  [] All goals are met  [] Other:     ASSESSMENT:  Noting mild decreased extrinsic and intrinsic tightness with compensatory movements continuing to reinforce abnormal movement patterns. Moved pt to 1x/week to continue working on decreasing tightness prior to reattempting more intensive neuromuscular reeducation. Treatment/Activity Tolerance:  [x] Patient tolerated treatment well [] Patient limited by fatique  [] Patient limited by pain  [] Patient limited by other medical complications  [] Other:     Prognosis: [x] Good [] Fair  [] Poor    Patient Requires Follow-up: [x] Yes  [] No    PLAN: See eval  [x] Continue per plan of care [] Alter current plan (see comments)  [] Plan of care initiated (MD cosigned) [] Hold pending MD visit [] Discharge    Electronically signed by: RAMBO Sheth/L, C/NDT ([id number])      Note: If patient does not return for scheduled/ recommended follow up visits, this note will serve as a discharge from care along with most recent update on progress.

## 2022-06-23 ENCOUNTER — HOSPITAL ENCOUNTER (OUTPATIENT)
Dept: OCCUPATIONAL THERAPY | Age: 22
Setting detail: THERAPIES SERIES
Discharge: HOME | End: 2022-06-23

## 2022-06-23 NOTE — FLOWSHEET NOTE
Occupational Therapy  Cancellation/No-show Note  Patient Name:  Paramjit Iraheta   :  2000   Date:  2022  Cancelled visits to date: 0  No-shows to date: 2    Patient status for today's appointment patient:  []  Cancelled  []  Rescheduled appointment  [x]  No-show - 22, 22     Reason given by patient:  []  Patient ill  []  Conflicting appointment  []  No transportation    []  Conflict with work  []  No reason given  []  Other:     Comments:      Phone call information:   []  Phone call made today to patient at _ time at number provided:      []  Patient answered, conversation as follows:    []  Patient did not answer, message left as follows:  []  Phone call not made today   [x] Emailed pt to check in and notify pt of missed appt and next appt/opportunity to reschedule tomorrow.       Electronically signed by:  RAMBO Beckman/L, C/LALI ([id number])

## 2022-06-23 NOTE — FLOWSHEET NOTE
Occupational Therapy  Cancellation/No-show Note  Patient Name:  Yanet Hendrix   :  2000   Date:  2022  Cancelled visits to date: 0  No-shows to date: 2    Patient status for today's appointment patient:  []  Cancelled  []  Rescheduled appointment  [x]  No-show - 22, 22     Reason given by patient:  []  Patient ill  []  Conflicting appointment  []  No transportation    []  Conflict with work  []  No reason given  []  Other:     Comments:      Phone call information:   []  Phone call made today to patient at _ time at number provided:      []  Patient answered, conversation as follows:    []  Patient did not answer, message left as follows:  []  Phone call not made today   [x] Emailed pt to check in and notify pt of missed appt and next appt/opportunity to reschedule tomorrow.       Electronically signed by:  RAMBO Thayer/SALONI, C/NDT ([id number])

## 2022-06-30 ENCOUNTER — HOSPITAL ENCOUNTER (OUTPATIENT)
Dept: OCCUPATIONAL THERAPY | Age: 22
Setting detail: THERAPIES SERIES
Discharge: HOME | End: 2022-06-30

## 2022-06-30 PROCEDURE — 97035 APP MDLTY 1+ULTRASOUND EA 15: CPT

## 2022-06-30 PROCEDURE — 97140 MANUAL THERAPY 1/> REGIONS: CPT

## 2022-06-30 PROCEDURE — 97110 THERAPEUTIC EXERCISES: CPT

## 2022-06-30 NOTE — FLOWSHEET NOTE
[facility] Occupational Therapy  [address] [place] [address] [place],[address], [address]  Phone: [phone]   Fax: [phone]      Occupational Therapy Daily Treatment Note  Date:  2022    Patient: Victorina Gannon   : 2000  MRN: [id number]  Referring Physician: Dr. 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 Date Range   eval +  Self pay []  Yes  [x]  No n/a       Latex Allergy:  [x]No      []Yes  Pacemaker:  [x] No       [] Yes     Preferred Language for Healthcare:   []English       [x]other: Nepali    Pain level:  Improved to 1/10 at rest on this date, R hand and wrist    SUBJECTIVE:  Pt reports she is unsure how long she is wearing the wrist and finger extension splint at night as she is removing it in her sleep. Pt reports 2 instances last week that NATALYA went flaccid while she was using it. When asked if any other symptoms, pt reports L facial change in sensation. Pt instructed to obtain medical attn if this happens again as these could be neurological signs. Hx Bell's palsy.       Functional Disability Index:  FOTO:  Patient's Physical FS Primary Measure- 63  Risk Adjusted Statistical FOTO- 58     Quick DASH: total score- 26, disability score- 34.09%    22- emailed FOTO progress survey    OBJECTIVE:     ROM  22: with wrist in neutral, able to extend MCPs to neutral with full flexion of IPs and able to extend IPs to neutral with 90* flexion of MCPs     Hand Assessment Left  Right  Comments    9 Hole Peg Test (s) 30 42      Strength (lbs) 65 35  6/17/22: 42     Lateral Pinch Strength (lbs) 17 10     Tip Pinch Strength (lbs) 12 5     Opposition (Y/N) Y Y          RESTRICTIONS/PRECAUTIONS: none noted on order    Exercises/Interventions: Treatment focused on increasing pain-free ROM and voluntary control of R hand and wrist. Session initiated with discussion regarding night splinting and provision of large tensogrip to wear over splint to make it more difficult to unconsciously remove; pt instructed that if splint wear is painful and wakes her up, splint may be removed, but this will reduce involuntary removal. 8 min US to volar distal forearm at original injury site due to scar tissue build up with progressive extrinsic and intrinsic stretch. IASTM of forearm using hawk , followed gentle joint mobilization for metacarpal spread and finger extension. WFL lateral pinch with emerging tip pinch with wrist in neutral during functional retrieval/placement practice.     Therapeutic Exercises  Resistance / level Sets/sec Reps Notes                                                                                Neuromuscular Re-ed / Therapeutic Activities                                                 Manual Intervention                                                     Modalities: , 8 min US (100%, 1.0)  Patient education:  Eval, POC, HEP- pt verbalized understanding        Home Exercise Program:  Written and verbal HEP instructions provided and reviewed:  · Wrist extension stretch  · Finger extension stretch  · Continued functional use  · Forearm-based finger extension splint at night   · 6/1/22: bimanual chest press, mid row, horizontal ab/adduction, and shoulder flexion/extension grasping broom      Therapeutic Exercise and NMR:  [x] (01529) Provided verbal/tactile cueing for activities related to strengthening, flexibility, endurance, ROM  for improvements in scapular, scapulothoracic and UE control with self care, reaching, carrying, lifting, house/yardwork, driving/computer work.    [] (68139) Provided verbal/tactile cueing for activities related to improving balance, coordination, kinesthetic sense, posture, motor skill, proprioception  to assist with  scapular, scapulothoracic and UE control with self care, reaching, carrying, lifting, house/yardwork, driving/computer work.   [] Comments:    Therapeutic Activities:    [] (35995 or 82674) Provided verbal/tactile cueing for activities related to improving balance, coordination, kinesthetic sense, posture, motor skill, proprioception and motor activation to allow for proper function of scapular, scapulothoracic and UE control with self care, carrying, lifting, driving/computer work  [] Comments:    Home Exercise Program:    [x] (14956) Reviewed/Progressed HEP activities related to strengthening, flexibility, endurance, ROM of scapular, scapulothoracic and UE control with self care, reaching, carrying, lifting, house/yardwork, driving/computer work  [] (31071) Reviewed/Progressed HEP activities related to improving balance, coordination, kinesthetic sense, posture, motor skill, proprioception of scapular, scapulothoracic and UE control with self care, reaching, carrying, lifting, house/yardwork, driving/computer work    [] Comments:    Manual Treatments:  PROM / STM / Oscillations-Mobs:  G-I, II, III, IV (PA's, Inf., Post.)  [x] (64467) Provided manual therapy to mobilize soft tissue/joints of cervical/CT, scapular GHJ and UE for the purpose of modulating pain, promoting relaxation,  increasing ROM, reducing/eliminating soft tissue swelling/inflammation/restriction, improving soft tissue extensibility and allowing for proper ROM for normal function with self care, reaching, carrying, lifting, house/yardwork, driving/computer work  [] Comments:    ADL Training:  [] (88920) Provided self-care/home management training related to activities of daily living and compensatory training, and/or use of adaptive equipment   [] Comments:     Splinting:  [] Fabrication of:   [] (70647) Orthotic/Prosthetic Management, subsequent encounter  [] (52966) Orthotic management and training (fitting and assessment)  [] Comments:      Charges:  Timed Code Treatment Minutes: 45   Total Treatment Minutes: 45     [] EVAL (LOW) 92735   [] OT Re-eval (78491)  [] EVAL (MOD) 67805   [] EVAL (HIGH) 74427       [x] Ayesha (05258) x   1  [] FNLLN(48577)  [] NMR (03016) x     [] Estim (attended) (16428)   [x] Manual ([id number]) x    1 [x] US (04207)  [] TA (59814) x     [] Paraffin (05700)  [] ADL  (88 649 24 60) x    [] Splint/L code:    [] Estim (unattended) (22 404568)  [] Fluidotherapy (41549)  [] Other:    GOALS:     Patient stated goal: decrease pain  [x]? ? Progressing: []?? Met: []?? Not Met: []?? Adjusted     Therapist goals for Patient:   Short Term Goals: To be achieved in: 30 days  1. Pt will decrease compensatory movement patterns to extend fingers from full fist while maintaining neutral wrist for improved functional use by 6/12/22. [x]? ? Progressing: []?? Met: []?? Not Met: []?? Adjusted  2. Pt will improve R hand/wrist ROM and strength to lift 40# bimanually to increase independence in IADLs and childrearing responsibilities by 6/12/22. [x]? ? Progressing: []?? Met: []?? Not Met: []?? Adjusted  3. Pt will decrease pain to report average weekly pain level of 4/10 or less to improve quality of life by 6/12/22. [x]? ? Progressing: []?? Met: []?? Not Met: []?? Adjusted     Long Term Goals: To be achieved by discharge  1. FOTO score of at least 74 to assist with reaching prior level of function. [x]? Progressing: []? Met: []? Not Met: []? Adjusted  2.  Pt will report a QuickDASH Symptom Severity Scale score of 25% or less indicating increased safety and functional independence in desired occupational pursuits by discharge. [x]? ? Progressing: []?? Met: []?? Not Met: []?? Adjusted    Progression Towards Functional goals:  [x] Patient is progressing as expected towards functional goals listed. [] Progression is slowed due to complexities listed. [] Progression has been slowed due to co-morbidities. [] Plan just implemented, too soon to assess goals progression  [] All goals are met  [] Other:     ASSESSMENT:  Noting mild decreased extrinsic and intrinsic tightness with compensatory movements continuing to reinforce abnormal movement patterns. Continue working on decreasing tightness prior to reattempting more intensive neuromuscular reeducation. Treatment/Activity Tolerance:  [x] Patient tolerated treatment well [] Patient limited by fatique  [] Patient limited by pain  [] Patient limited by other medical complications  [] Other:     Prognosis: [x] Good [] Fair  [] Poor    Patient Requires Follow-up: [x] Yes  [] No    PLAN: See eval  [x] Continue per plan of care [] Alter current plan (see comments)  [] Plan of care initiated (MD cosigned) [] Hold pending MD visit [] Discharge    Electronically signed by: RAMBO Knox/SALONI, C/NDT ([id number])      Note: If patient does not return for scheduled/ recommended follow up visits, this note will serve as a discharge from care along with most recent update on progress.

## 2022-07-07 ENCOUNTER — HOSPITAL ENCOUNTER (OUTPATIENT)
Dept: OCCUPATIONAL THERAPY | Age: 22
Setting detail: THERAPIES SERIES
Discharge: HOME | End: 2022-07-07

## 2022-07-07 NOTE — FLOWSHEET NOTE
Occupational Therapy  Cancellation/No-show Note  Patient Name:  Yohannes Tony   :  2000   Date:  2022  Cancelled visits to date: 1  No-shows to date: 2    Patient status for today's appointment patient:  [x]  Cancelled: 2022  []  Rescheduled appointment  []  No-show - 22, 22     Reason given by patient:  []  Patient ill  []  Conflicting appointment  []  No transportation    [x]  Conflict with work  []  No reason given  []  Other:     Comments:      Phone call information:   []  Phone call made today to patient at _ time at number provided:      []  Patient answered, conversation as follows:    []  Patient did not answer, message left as follows:  [x]  Phone call not made today       Electronically signed by:  RAMBO Erazo/SALONI, C/NDT ([id number])  Yvonne Jaeger S/OT    Occupational Therapist was present, directed the patient's care, made skilled judgement, and was responsible for assessment and treatment of the patient.

## 2022-07-13 ENCOUNTER — HOSPITAL ENCOUNTER (OUTPATIENT)
Dept: OCCUPATIONAL THERAPY | Age: 22
Setting detail: THERAPIES SERIES
Discharge: HOME | End: 2022-07-13

## 2022-07-13 PROCEDURE — 97112 NEUROMUSCULAR REEDUCATION: CPT

## 2022-07-13 PROCEDURE — 97035 APP MDLTY 1+ULTRASOUND EA 15: CPT

## 2022-07-13 PROCEDURE — 97140 MANUAL THERAPY 1/> REGIONS: CPT

## 2022-07-13 NOTE — PLAN OF CARE
[facility] Occupational Therapy  [address] [place] [address] Drive  Phone: [phone]   Fax: [phone]      Occupational Therapy Re-Certification Plan of Care    Dear Dr. Lucie Odell MD,    We had the pleasure of treating the following patient for occupational therapy services at [address] [place] [address]. A summary of our findings can be found in the updated assessment below. This includes our plan of care. If you have any questions or concerns regarding these findings, please do not hesitate to contact me at the office phone number checked above. Thank you for the referral.     Physician Signature:________________________________Date:__________________  By signing above (or electronic signature), therapists plan is approved by physician      Functional Outcome:     FOTO:  Patient's Physical FS Primary Measure- 63  Risk Adjusted Statistical FOTO- 58     Quick DASH: total score- 26, disability score- 34.09%    Overall Response to Treatment:   [x]Patient is responding well to treatment and improvement is noted with regards  to goals   []Patient should continue to improve in reasonable time if they continue HEP   []Patient has plateaued and is no longer responding to skilled OT intervention    []Patient is getting worse and would benefit from return to referring MD   []Patient unable to adhere to initial POC   []Other: Pt has made improvement in R hand  strength from 35 to 57 pounds in addition to improvement in tip and lateral pinch strength. Decreased tightness palpated in finger and wrist flexors with improved PROM. Emerging improvements in functional wrist and finger extension with increased isolated wrist and finger extension.      Date range of Visits: 5/12/22-7/13/22   Total Visits: 10    Recommendation:    [x]Finish POC next week and have patient complete HEP and splint wear for 3-4 weeks prior to next session to determine if decreased extrinsic tightness will improve attempts at decreasing abnormal movement patterns during functional tasks; if so, continue OT 1-2x/wk for 3-4 wks   []Hold OT, pending MD visit      Occupational Therapy Daily Treatment Note  Date:  2022    Patient: Red Yuriy \"Charlee\"  : 2000  MRN: [id number]  Referring Physician: Dr. 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 Date Range   eval + 10/12 Self pay []  Yes  [x]  No n/a       Latex Allergy:  [x]No      []Yes  Pacemaker:  [x] No       [] Yes     Preferred Language for Healthcare:   []English       [x]other: Pashto    Pain level:  Improved to 1/10 at rest on this date, R hand and wrist    SUBJECTIVE:  Pt reports she is now able to do her daughter's hair. Pt reports work is going well and she is able to use RUE to assist in lifting tasks. Pt is now wearing splint through the night with mild tingling sometimes in the morning that resolves once splint is removed with no residual discomfort and improved ROM.      Functional Disability Index:  FOTO:  Patient's Physical FS Primary Measure- 63  Risk Adjusted Statistical FOTO- 58     Quick DASH: total score- 26, disability score- 34.09%    22- emailed FOTO progress survey    OBJECTIVE:     ROM  22: with wrist in neutral, able to extend MCPs to neutral with full flexion of IPs and able to extend IPs to neutral with 90* flexion of MCPs     7/13/22:     Hand Assessment Left  Right  Comments    9 Hole Peg Test (s) 30 42      Strength (lbs) 65 35  6/17/22: 42  7/13/22: 57     Lateral Pinch Strength (lbs) 17 10  7/13/22: 18     Tip Pinch Strength (lbs) 12 5  7/13/22: 10.5     Opposition (Y/N) Y Y        RESTRICTIONS/PRECAUTIONS: none noted on order    Exercises/Interventions: Treatment focused on increasing pain-free ROM and voluntary control of R hand and wrist. Session initiated with assessment of pt progress with subjective and objective measures noted above, and progress with goals noted below. 6 min US to volar distal forearm at original injury site due to scar tissue build up with progressive extrinsic and intrinsic stretch. 4 minutes US to palmar surface of hand for soft tissue benefits with progressive finger extension; pt reporting decreased tightness following. STM of ventral and dorsal forearm with continuous progressive stretch with improved wrist extension following while hand maintaining wide cylindrical grasp. Training on hand>mouth patterns with wrist held in neutral with improved partial grasp/release without wrist flexion. Instruction on NMR and importance of inhibiting abnormal and compensatory movement patterns during practice at home with pt demonstrating understanding.      Therapeutic Exercises  Resistance / level Sets/sec Reps Notes                                                                                Neuromuscular Re-ed / Therapeutic Activities                                                 Manual Intervention                                                     Modalities: , 10 min US (100%, 1.0)  Patient education:  PILAR Harry, HEP- pt verbalized understanding        Home Exercise Program:  Written and verbal HEP instructions provided and reviewed:  · Wrist extension stretch  · Finger extension stretch  · Continued functional use  · Forearm-based finger extension splint at night · 6/1/22: bimanual chest press, mid row, horizontal ab/adduction, and shoulder flexion/extension grasping broom      Therapeutic Exercise and NMR:  [x] (89287) Provided verbal/tactile cueing for activities related to strengthening, flexibility, endurance, ROM  for improvements in scapular, scapulothoracic and UE control with self care, reaching, carrying, lifting, house/yardwork, driving/computer work.    [] (22589) Provided verbal/tactile cueing for activities related to improving balance, coordination, kinesthetic sense, posture, motor skill, proprioception  to assist with  scapular, scapulothoracic and UE control with self care, reaching, carrying, lifting, house/yardwork, driving/computer work.   [] Comments:    Therapeutic Activities:    [] (31889 or 24416) Provided verbal/tactile cueing for activities related to improving balance, coordination, kinesthetic sense, posture, motor skill, proprioception and motor activation to allow for proper function of scapular, scapulothoracic and UE control with self care, carrying, lifting, driving/computer work  [] Comments:    Home Exercise Program:    [x] (44294) Reviewed/Progressed HEP activities related to strengthening, flexibility, endurance, ROM of scapular, scapulothoracic and UE control with self care, reaching, carrying, lifting, house/yardwork, driving/computer work  [] (87853) Reviewed/Progressed HEP activities related to improving balance, coordination, kinesthetic sense, posture, motor skill, proprioception of scapular, scapulothoracic and UE control with self care, reaching, carrying, lifting, house/yardwork, driving/computer work    [] Comments:    Manual Treatments:  PROM / STM / Oscillations-Mobs:  G-I, II, III, IV (PA's, Inf., Post.)  [x] (59367) Provided manual therapy to mobilize soft tissue/joints of cervical/CT, scapular GHJ and UE for the purpose of modulating pain, promoting relaxation,  increasing ROM, reducing/eliminating soft tissue swelling/inflammation/restriction, improving soft tissue extensibility and allowing for proper ROM for normal function with self care, reaching, carrying, lifting, house/yardwork, driving/computer work  [] Comments:    ADL Training:  [] (16070) Provided self-care/home management training related to activities of daily living and compensatory training, and/or use of adaptive equipment   [] Comments:     Splinting:  [] Fabrication of:   [] (02200) Orthotic/Prosthetic Management, subsequent encounter  [] (99064) Orthotic management and training (fitting and assessment)  [] Comments:      Charges:  Timed Code Treatment Minutes: 45   Total Treatment Minutes: 45     [] EVAL (LOW) 11794   [] OT Re-eval (52186)  [] EVAL (MOD) 86357   [] EVAL (HIGH) [phone]       [] Ayesha (94270) x     [] PUAJS(12964)  [x] NMR (10288) x    1 [] Estim (attended) (75375)   [x] Manual ([id number]) x    1 [x] US (85407)  [] TA (53988) x     [] Paraffin (49132)  [] ADL  (88 649 24 60) x    [] Splint/L code:    [] Estim (unattended) (22 263217)  [] Fluidotherapy (90906)  [] Other:    GOALS:     Patient stated goal: decrease pain  [x]? ? Progressing: []?? Met: []?? Not Met: []?? Adjusted     Therapist goals for Patient:   Short Term Goals: To be achieved in: 30 days  1. Pt will decrease compensatory movement patterns to extend fingers from full fist while maintaining neutral wrist for improved functional use by 6/12/22. [x]? ? Progressing: []?? Met: []?? Not Met: []?? Adjusted  2. Pt will improve R hand/wrist ROM and strength to lift 40# bimanually to increase independence in IADLs and childrearing responsibilities by 6/12/22. [x]? ? Progressing: []?? Met: []?? Not Met: []?? Adjusted  3. Pt will decrease pain to report average weekly pain level of 4/10 or less to improve quality of life by 6/12/22. [x]? ? Progressing: []?? Met: []?? Not Met: []?? Adjusted     Long Term Goals: To be achieved by discharge  1.  FOTO score of at least 74 to assist with reaching prior

## 2022-07-22 ENCOUNTER — HOSPITAL ENCOUNTER (OUTPATIENT)
Dept: OCCUPATIONAL THERAPY | Age: 22
Setting detail: THERAPIES SERIES
End: 2022-07-22

## 2022-07-28 ENCOUNTER — HOSPITAL ENCOUNTER (OUTPATIENT)
Dept: OCCUPATIONAL THERAPY | Age: 22
Setting detail: THERAPIES SERIES
Discharge: HOME | End: 2022-07-28

## 2022-07-28 PROCEDURE — 97140 MANUAL THERAPY 1/> REGIONS: CPT

## 2022-07-28 PROCEDURE — 97035 APP MDLTY 1+ULTRASOUND EA 15: CPT

## 2022-07-28 PROCEDURE — 97110 THERAPEUTIC EXERCISES: CPT

## 2022-07-28 NOTE — FLOWSHEET NOTE
[address] [facility] Occupational Therapy  [address] [place] [address], [address]  Phone: [phone]   Fax: [phone]      Occupational Therapy Daily Treatment Note  Date:  2022    Patient: Yohannes Tony \"Charlee\"  : 2000  MRN: [id number]  Referring Physician: Dr. 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  Date Range   eval +  + 08 Self pay []  Yes  [x]  No n/a       Latex Allergy:  [x]No      []Yes  Pacemaker:  [x] No       [] Yes     Preferred Language for Healthcare:   []English       [x]other: Norwegian    Pain level:  Improved to 1/10 at rest on this date, R hand and wrist    SUBJECTIVE:  Pt reports improved finger extension with neutral wrist.     Functional Disability Index:  FOTO:  Patient's Physical FS Primary Measure- 63  Risk Adjusted Statistical FOTO- 58     Quick DASH: total score- 26, disability score- 34.09%    22- emailed FOTO progress survey    OBJECTIVE:     ROM  22: with wrist in neutral, able to extend MCPs to neutral with full flexion of IPs and able to extend IPs to neutral with 90* flexion of MCPs       Hand Assessment Left  Right  Comments    9 Hole Peg Test (s) 30 42      Strength (lbs) 65 35  22: 42  22: 57     Lateral Pinch Strength (lbs) 17 10  22: 18     Tip Pinch Strength (lbs) 12 5  22: 10.5     Opposition (Y/N) Y Y RESTRICTIONS/PRECAUTIONS: none noted on order    Exercises/Interventions: Treatment focused on increasing pain-free ROM and voluntary control of R hand and wrist. Session initiated with 8 min US to volar distal forearm at original injury site due to scar tissue build up with progressive extrinsic and intrinsic stretch with cylindrical grasp of cone. STM of ventral and dorsal forearm with continuous progressive stretch with improved wrist extension following while hand maintaining wide cylindrical grasp. [place] e-stim to facilitate wrist and finger extension during functional grasp/release with emphasis on maintaining neural wrist. Reinforced importance of inhibiting abnormal and compensatory movement patterns during practice at home with pt demonstrating understanding.      Therapeutic Exercises  Resistance / level Sets/sec Reps Notes                                                                                Neuromuscular Re-ed / Therapeutic Activities                                                 Manual Intervention                                                     Modalities: , 10 min US (100%, 1.0)    Patient education:  Eval, POC, HEP- pt verbalized understanding        Home Exercise Program:  Written and verbal HEP instructions provided and reviewed:  Wrist extension stretch  Finger extension stretch  Continued functional use  Forearm-based finger extension splint at night   6/1/22: bimanual chest press, mid row, horizontal ab/adduction, and shoulder flexion/extension grasping broom      Therapeutic Exercise and NMR:  [x] (68124) Provided verbal/tactile cueing for activities related to strengthening, flexibility, endurance, ROM  for improvements in scapular, scapulothoracic and UE control with self care, reaching, carrying, lifting, house/yardwork, driving/computer work.    [] (72462) Provided verbal/tactile cueing for activities related to improving balance, coordination, kinesthetic sense, posture, motor skill, proprioception  to assist with  scapular, scapulothoracic and UE control with self care, reaching, carrying, lifting, house/yardwork, driving/computer work.   [] Comments:    Therapeutic Activities:    [] (24747 or 71937) Provided verbal/tactile cueing for activities related to improving balance, coordination, kinesthetic sense, posture, motor skill, proprioception and motor activation to allow for proper function of scapular, scapulothoracic and UE control with self care, carrying, lifting, driving/computer work  [] Comments:    Home Exercise Program:    [x] (70843) Reviewed/Progressed HEP activities related to strengthening, flexibility, endurance, ROM of scapular, scapulothoracic and UE control with self care, reaching, carrying, lifting, house/yardwork, driving/computer work  [] (64324) Reviewed/Progressed HEP activities related to improving balance, coordination, kinesthetic sense, posture, motor skill, proprioception of scapular, scapulothoracic and UE control with self care, reaching, carrying, lifting, house/yardwork, driving/computer work    [] Comments:    Manual Treatments:  PROM / STM / Oscillations-Mobs:  G-I, II, III, IV (PA's, Inf., Post.)  [x] (66717) Provided manual therapy to mobilize soft tissue/joints of cervical/CT, scapular GHJ and UE for the purpose of modulating pain, promoting relaxation,  increasing ROM, reducing/eliminating soft tissue swelling/inflammation/restriction, improving soft tissue extensibility and allowing for proper ROM for normal function with self care, reaching, carrying, lifting, house/yardwork, driving/computer work  [] Comments:    ADL Training:  [] (24529) Provided self-care/home management training related to activities of daily living and compensatory training, and/or use of adaptive equipment   [] Comments:     Splinting:  [] Fabrication of:   [] (25014) Orthotic/Prosthetic Management, subsequent encounter  [] (57756) Orthotic management and training (fitting and assessment)  [] Comments:      Charges:  Timed Code Treatment Minutes: 45   Total Treatment Minutes: 45     [] EVAL (LOW) 27870   [] OT Re-eval (36509)  [] EVAL (MOD) 83109   [] EVAL (HIGH) 30992       [x] Ayesha (61043) x    1 [] QIXOL(44211)  [] NMR (56439) x     [] Estim (attended) (72117)   [x] Manual ([id number]) x    1 [x] US (33599)  [] TA (14376) x     [] Paraffin (00891)  [] ADL  (87580) x    [] Splint/L code:    [] Estim (unattended) (22 [id number])  [] Fluidotherapy (47223)  [] Other:    GOALS:     Patient stated goal: decrease pain  [x] Progressing: [] Met: [] Not Met: [] Adjusted     Therapist goals for Patient:   Short Term Goals: To be achieved in: 30 days  1. Pt will decrease compensatory movement patterns to extend fingers from full fist while maintaining neutral wrist for improved functional use by 6/12/22. [x] Progressing: [] Met: [] Not Met: [] Adjusted  2. Pt will improve R hand/wrist ROM and strength to lift 40# bimanually to increase independence in IADLs and childrearing responsibilities by 6/12/22. [] Progressing: [x] Met: [] Not Met: [] Adjusted  3. Pt will decrease pain to report average weekly pain level of 4/10 or less to improve quality of life by 6/12/22. [x] Progressing: [] Met: [] Not Met: [] Adjusted     Long Term Goals: To be achieved by discharge  1. FOTO score of at least 74 to assist with reaching prior level of function. [x] Progressing: [] Met: [] Not Met: [] Adjusted  2. Pt will report a QuickDASH Symptom Severity Scale score of 25% or less indicating increased safety and functional independence in desired occupational pursuits by discharge. [x] Progressing: [] Met: [] Not Met: [] Adjusted    Progression Towards Functional goals:  [x] Patient is progressing as expected towards functional goals listed. [] Progression is slowed due to complexities listed. [] Progression has been slowed due to co-morbidities.   [] Plan just implemented, too soon to assess goals progression  [] All goals are met  [] Other:     ASSESSMENT:  Pt has made improvement in R hand  strength from 35 to 57 pounds in addition to improvement in tip and lateral pinch strength. Decreased tightness palpated in finger and wrist flexors with improved PROM. Emerging improvements in functional wrist and finger extension with increased isolated wrist and finger extension. Treatment/Activity Tolerance:  [x] Patient tolerated treatment well [] Patient limited by fatique  [] Patient limited by pain  [] Patient limited by other medical complications  [] Other:     Prognosis: [x] Good [] Fair  [] Poor    Patient Requires Follow-up: [x] Yes  [] No    PLAN: See eval  [x] Continue per plan of care [] Alter current plan (see comments)  [] Plan of care initiated (MD cosigned) [] Hold pending MD visit [] Discharge    Electronically signed by: RAMBO Perez/L, C/NDT ([id number])      Note: If patient does not return for scheduled/ recommended follow up visits, this note will serve as a discharge from care along with most recent update on progress.

## 2022-08-04 ENCOUNTER — HOSPITAL ENCOUNTER (OUTPATIENT)
Dept: OCCUPATIONAL THERAPY | Age: 22
Setting detail: THERAPIES SERIES
End: 2022-08-04

## 2022-08-11 ENCOUNTER — HOSPITAL ENCOUNTER (OUTPATIENT)
Dept: OCCUPATIONAL THERAPY | Age: 22
Setting detail: THERAPIES SERIES
Discharge: HOME | End: 2022-08-11

## 2022-08-11 NOTE — FLOWSHEET NOTE
Occupational Therapy  Cancellation/No-show Note  Patient Name:  Nela Vitale   :  2000   Date:  2022  Cancelled visits to date: 1  No-shows to date: 3    Patient status for today's appointment patient:  []  Cancelled: 22  []  Rescheduled appointment  [x]  No-show - 22, 22, 22     Reason given by patient:  []  Patient ill  []  Conflicting appointment  []  No transportation    []  Conflict with work  []  No reason given  []  Other:     Comments:      Phone call information:   []  Phone call made today to patient at _ time at number provided:      []  Patient answered, conversation as follows:    []  Patient did not answer, message left as follows:  [x]  Phone call not made today  Emailed pt to remind about therapy session with no response at this time. Electronically signed by:  RAMBO Perez/L, C/LALI ([id number])  ANA Branch/OT    Occupational Therapist was present, directed the patient's care, made skilled judgement, and was responsible for assessment and treatment of the patient.

## 2022-08-18 ENCOUNTER — HOSPITAL ENCOUNTER (OUTPATIENT)
Dept: OCCUPATIONAL THERAPY | Age: 22
Setting detail: THERAPIES SERIES
Discharge: HOME | End: 2022-08-18

## 2022-08-18 PROCEDURE — 97140 MANUAL THERAPY 1/> REGIONS: CPT

## 2022-08-18 PROCEDURE — 97110 THERAPEUTIC EXERCISES: CPT

## 2022-08-18 PROCEDURE — 97035 APP MDLTY 1+ULTRASOUND EA 15: CPT

## 2022-08-18 PROCEDURE — 97032 APPL MODALITY 1+ESTIM EA 15: CPT

## 2022-08-18 NOTE — PROGRESS NOTES
[address] [facility] Occupational Therapy  [address] [place] [address]  Phone: [phone]   Fax: [phone]      Occupational Therapy Daily Treatment Note  Date:  2022    Patient: Diana Hdez \"Charlee\"  : 2000  MRN: [id number]  Referring Physician: Dr. 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 Date Range   eval +  + 0/8 Self pay []  Yes  [x]  No n/a       Latex Allergy:  [x]No      []Yes  Pacemaker:  [x] No       [] Yes     Preferred Language for Healthcare:   []English       [x]other: Romansh    Pain level: 0/10 at rest    SUBJECTIVE:  Pt reports improved ability to  80# boxes at work, braid daughter's hair, and manipulate small objects. Pt reports minor low back tightness/pain.      Functional Disability Index:  FOTO:  Patient's Physical FS Primary Measure- 63  Risk Adjusted Statistical FOTO- 58     Quick DASH: total score- 26, disability score- 34.09%    22- emailed FOTO progress survey    OBJECTIVE:     ROM  22: with wrist in neutral, able to extend MCPs to neutral with full flexion of IPs and able to extend IPs to neutral with 90* flexion of MCPs       Hand Assessment Left  Right  Comments    9 Hole Peg Test (s) 30 42  22: 20      Strength (lbs) 65  22: 75 35  22: 42  22: 57  22: 67     Lateral Pinch Strength (lbs) 17 10  7/13/22: 18     Tip Pinch Strength (lbs) 12 5  7/13/22: 10.5     Opposition (Y/N) Y Y        RESTRICTIONS/PRECAUTIONS: none noted on order    Exercises/Interventions: Treatment focused on increasing pain-free ROM and voluntary control of R hand and wrist. Session initiated with assessment of pt progress with subjective and objective measures noted above, and progress with goals noted below. 7 min US to volar distal forearm at original injury site due to scar tissue build up with progressive extrinsic and intrinsic stretch with cylindrical grasp of cone, followed by 5 min US to palmar hand due to tightness with wrist maintained in neutral for continued stretch. IASTM of dorsal forearm and palmar hand with continuous progressive stretch; improved wrist extension following while hand maintaining wide cylindrical grasp. [place] e-stim to facilitate wrist and finger extension- completed manual stretching, place and hold, and functional grasp/release with emphasis on maintaining neutral wrist during this time. Discussed importance of continued nightly splint wear with pt instructed to bring splint in to assess if increased finger extension could be tolerated. Discussed and demonstrated ergonomic lifting to decrease back strain and pt provided with stretching resources.     Therapeutic Exercises  Resistance / level Sets/sec Reps Notes                                                                                Neuromuscular Re-ed / Therapeutic Activities                                                 Manual Intervention                                                     Modalities: , 12 min US    Patient education:  Eval, POC, HEP- pt verbalized understanding        Home Exercise Program:  Written and verbal HEP instructions provided and reviewed:  Wrist extension stretch  Finger extension stretch  Continued functional use  Forearm-based finger extension splint at night   6/1/22: bimanual chest press, mid row, horizontal ab/adduction, and shoulder flexion/extension grasping broom      Therapeutic Exercise and NMR:  [x] (26808) Provided verbal/tactile cueing for activities related to strengthening, flexibility, endurance, ROM  for improvements in scapular, scapulothoracic and UE control with self care, reaching, carrying, lifting, house/yardwork, driving/computer work.    [] (93766) Provided verbal/tactile cueing for activities related to improving balance, coordination, kinesthetic sense, posture, motor skill, proprioception  to assist with  scapular, scapulothoracic and UE control with self care, reaching, carrying, lifting, house/yardwork, driving/computer work.   [] Comments:    Therapeutic Activities:    [] (55860 or 65827) Provided verbal/tactile cueing for activities related to improving balance, coordination, kinesthetic sense, posture, motor skill, proprioception and motor activation to allow for proper function of scapular, scapulothoracic and UE control with self care, carrying, lifting, driving/computer work  [] Comments:    Home Exercise Program:    [x] (97664) Reviewed/Progressed HEP activities related to strengthening, flexibility, endurance, ROM of scapular, scapulothoracic and UE control with self care, reaching, carrying, lifting, house/yardwork, driving/computer work  [] (02058) Reviewed/Progressed HEP activities related to improving balance, coordination, kinesthetic sense, posture, motor skill, proprioception of scapular, scapulothoracic and UE control with self care, reaching, carrying, lifting, house/yardwork, driving/computer work    [] Comments:    Manual Treatments:  PROM / STM / Oscillations-Mobs:  G-I, II, III, IV (PA's, Inf., Post.)  [x] (03298) Provided manual therapy to mobilize soft tissue/joints of cervical/CT, scapular GHJ and UE for the purpose of modulating pain, promoting relaxation,  increasing ROM, reducing/eliminating soft tissue swelling/inflammation/restriction, improving soft

## 2022-08-25 ENCOUNTER — APPOINTMENT (OUTPATIENT)
Dept: OCCUPATIONAL THERAPY | Age: 22
End: 2022-08-25

## 2022-09-01 ENCOUNTER — HOSPITAL ENCOUNTER (OUTPATIENT)
Dept: OCCUPATIONAL THERAPY | Age: 22
Setting detail: THERAPIES SERIES
Discharge: HOME | End: 2022-09-01

## 2022-09-01 PROCEDURE — 97032 APPL MODALITY 1+ESTIM EA 15: CPT

## 2022-09-01 PROCEDURE — G0283 ELEC STIM OTHER THAN WOUND: HCPCS

## 2022-09-01 PROCEDURE — 97140 MANUAL THERAPY 1/> REGIONS: CPT

## 2022-09-01 PROCEDURE — 97110 THERAPEUTIC EXERCISES: CPT

## 2022-09-01 PROCEDURE — 97035 APP MDLTY 1+ULTRASOUND EA 15: CPT

## 2022-09-01 NOTE — PROGRESS NOTES
[address] [facility] Occupational Therapy  [address]  [place], [address]  Phone: [phone]   Fax: [phone]      Occupational Therapy Daily Treatment Note  Date:  2022    Patient: Augustina Winkler \"Charlee\"  : 2000  MRN: [id number]  Referring Physician: Dr. 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 Date Range   eval +  +  Self pay []  Yes  [x]  No n/a       Latex Allergy:  [x]No      []Yes  Pacemaker:  [x] No       [] Yes     Preferred Language for Healthcare:   []English       [x]other: Polish    Pain level: 0/10 at rest    SUBJECTIVE:  Pt reports her children have gone back to school. Pt presents with new e-stim unit from [address] [facility] [address] to use at home.      Functional Disability Index:  FOTO:  Patient's Physical FS Primary Measure- 63  Risk Adjusted Statistical FOTO- 58     Quick DASH: total score- 26, disability score- 34.09%    22- emailed FOTO progress survey    OBJECTIVE:     ROM  22: with wrist in neutral, able to extend MCPs to neutral with full flexion of IPs and able to extend IPs to neutral with 90* flexion of MCPs       Hand Assessment Left  Right  Comments    9 Hole Peg Test (s) 30 42  22: 20      Strength (lbs) 65  22: 75 35  22: 42  22: 57  22: 67     Lateral Pinch Strength (lbs) 17 10  22: 18     Tip Pinch Strength (lbs) 12 5  7/13/22: 10.5     Opposition (Y/N) Y Y        RESTRICTIONS/PRECAUTIONS: none noted on order    Exercises/Interventions: Treatment focused on increasing pain-free ROM and voluntary control of R hand and wrist. Session initiated with 5 min US to volar distal forearm at original injury site due to scar tissue build up with progressive extrinsic and intrinsic stretch with cylindrical grasp of cone, followed by 5 min US to palmar hand due to tightness with wrist maintained in neutral for continued stretch. IASTM of dorsal forearm and palmar hand with continuous progressive stretch. Education on and use of e-stim unit pt brought from home to facilitate wrist and finger extension. Additional education on use of e-stim for at-home use; pt provided with instructions for settings and placement of pads. Pt to start with 10 minutes at a time while grasping/releasing cone and stretching with L hand. Therapeutic Exercises  Resistance / level Sets/sec Reps Notes                                                                                Neuromuscular Re-ed / Therapeutic Activities                                                 Manual Intervention                                                     Modalities: , 10 min US, 10 min e-stim     Patient education:  Eval, POC, HEP- pt verbalized understanding        Home Exercise Program:  Written and verbal HEP instructions provided and reviewed:  Wrist extension stretch  Finger extension stretch  Continued functional use  Forearm-based finger extension splint at night   6/1/22: bimanual chest press, mid row, horizontal ab/adduction, and shoulder flexion/extension grasping broom      Therapeutic Exercise and NMR:  [x] (80502) Provided verbal/tactile cueing for activities related to strengthening, flexibility, endurance, ROM  for improvements in scapular, scapulothoracic and UE control with self care, reaching, carrying, lifting, house/yardwork, driving/computer work.     [] (31663) Provided verbal/tactile cueing for activities related to improving balance, coordination, kinesthetic sense, posture, motor skill, proprioception  to assist with  scapular, scapulothoracic and UE control with self care, reaching, carrying, lifting, house/yardwork, driving/computer work.   [] Comments:    Therapeutic Activities:    [] (87760 or 52321) Provided verbal/tactile cueing for activities related to improving balance, coordination, kinesthetic sense, posture, motor skill, proprioception and motor activation to allow for proper function of scapular, scapulothoracic and UE control with self care, carrying, lifting, driving/computer work  [] Comments:    Home Exercise Program:    [x] (80963) Reviewed/Progressed HEP activities related to strengthening, flexibility, endurance, ROM of scapular, scapulothoracic and UE control with self care, reaching, carrying, lifting, house/yardwork, driving/computer work  [] (78343) Reviewed/Progressed HEP activities related to improving balance, coordination, kinesthetic sense, posture, motor skill, proprioception of scapular, scapulothoracic and UE control with self care, reaching, carrying, lifting, house/yardwork, driving/computer work    [] Comments:    Manual Treatments:  PROM / STM / Oscillations-Mobs:  G-I, II, III, IV (PA's, Inf., Post.)  [x] (69836) Provided manual therapy to mobilize soft tissue/joints of cervical/CT, scapular GHJ and UE for the purpose of modulating pain, promoting relaxation,  increasing ROM, reducing/eliminating soft tissue swelling/inflammation/restriction, improving soft tissue extensibility and allowing for proper ROM for normal function with self care, reaching, carrying, lifting, house/yardwork, driving/computer work  [] Comments:    ADL Training:  [] (25210) Provided self-care/home management training related to activities of daily living and compensatory training, and/or use of adaptive equipment   [] Comments:     Splinting:  [] Fabrication of:   [] (38892) Orthotic/Prosthetic Management, subsequent encounter  [] (52642) Orthotic management and training (fitting and assessment)  [] Comments:      Charges:  Timed Code Treatment Minutes: 55   Total Treatment Minutes: 55     [] EVAL (LOW) 80189   [] OT Re-eval (80658)  [] EVAL (MOD) 19507   [] EVAL (HIGH) 47918       [x] Ayesha (60673) x    1 [] JBXNY(14978)  [] NMR (35228) x     [x] Estim (attended) (63742)   [x] Manual ([id number]) x    1 [x] US (08821)  [] TA (57283) x     [] Paraffin (74342)  [] ADL  (19651) x    [] Splint/L code:    [] Estim (unattended) (22 [id number])  [] Fluidotherapy (13424)  [] Other:    GOALS:     Patient stated goal: decrease pain  [x] Progressing: [] Met: [] Not Met: [] Adjusted     Therapist goals for Patient:   Short Term Goals: To be achieved in: 30 days  1. Pt will decrease compensatory movement patterns to extend fingers from full fist while maintaining neutral wrist for improved functional use by 6/12/22. [x] Progressing: [] Met: [] Not Met: [] Adjusted  2. Pt will improve R hand/wrist ROM and strength to lift 40# bimanually to increase independence in IADLs and childrearing responsibilities by 6/12/22. [] Progressing: [x] Met: [] Not Met: [] Adjusted  3. Pt will decrease pain to report average weekly pain level of 4/10 or less to improve quality of life by 6/12/22. [] Progressing: [x] Met: [] Not Met: [] Adjusted     Long Term Goals: To be achieved by discharge  1. FOTO score of at least 74 to assist with reaching prior level of function. [x] Progressing: [] Met: [] Not Met: [] Adjusted  2. Pt will report a QuickDASH Symptom Severity Scale score of 25% or less indicating increased safety and functional independence in desired occupational pursuits by discharge. [x] Progressing: [] Met: [] Not Met: [] Adjusted    Progression Towards Functional goals:  [x] Patient is progressing as expected towards functional goals listed.     [] Progression is slowed due to

## 2022-09-08 ENCOUNTER — HOSPITAL ENCOUNTER (OUTPATIENT)
Dept: OCCUPATIONAL THERAPY | Age: 22
Setting detail: THERAPIES SERIES
Discharge: HOME | End: 2022-09-08

## 2022-09-08 NOTE — FLOWSHEET NOTE
Occupational Therapy  Cancellation/No-show Note  Patient Name:  Ricardo Edwards   :  2000   Date:  2022  Cancelled visits to date: 1  No-shows to date: 4    Patient status for today's appointment patient:  []  Cancelled: 22  []  Rescheduled appointment  [x]  No-show - 22, 22, 22, 22     Reason given by patient:  []  Patient ill  []  Conflicting appointment  []  No transportation    []  Conflict with work  []  No reason given  []  Other:     Comments:      Phone call information:   [x]  Phone call made today to patient at 1:40 pm at number provided:      [x]  Patient answered, conversation as follows: Pt apologized for missing appt, stating she had a medical appt for her child. Reminded of no show/cancellation policy and next appt.     []  Patient did not answer, message left as follows:  []  Phone call not made today        Electronically signed by:  RAMBO Martínez/L, C/NDT ([id number])

## 2022-09-15 ENCOUNTER — HOSPITAL ENCOUNTER (OUTPATIENT)
Dept: OCCUPATIONAL THERAPY | Age: 22
Setting detail: THERAPIES SERIES
Discharge: HOME | End: 2022-09-15

## 2022-09-15 NOTE — FLOWSHEET NOTE
Occupational Therapy  Cancellation/No-show Note  Patient Name:  Parul Edwards   :  2000   Date:  9/15/2022  Cancelled visits to date: 1  No-shows to date: 5    Patient status for today's appointment patient:  []  Cancelled: 22  []  Rescheduled appointment  [x]  No-show - 22, 22, 22, 22, 9/15/22     Reason given by patient:  []  Patient ill  []  Conflicting appointment  []  No transportation    []  Conflict with work  []  No reason given  []  Other:     Comments:      Phone call information:   []  Phone call made today to patient at number provided:      []  Patient answered, conversation as follows:    []  Patient did not answer, message left as follows:  []  Phone call not made today    Emailed pt with information regarding no show policy and plan to cancel additional appointments. Also explained that pt has been progressing well in therapy and would benefit from independent HEP to improve soft tissue elasticity prior to resumption for neuromuscular reeducation. No response at the time of this note.          Electronically signed by:  RAMBO Rodriguez/SALONI, C/NDT ([id number])

## 2022-09-22 ENCOUNTER — HOSPITAL ENCOUNTER (OUTPATIENT)
Dept: OCCUPATIONAL THERAPY | Age: 22
Setting detail: THERAPIES SERIES
End: 2022-09-22